# Patient Record
Sex: FEMALE | Race: WHITE | NOT HISPANIC OR LATINO | Employment: PART TIME | URBAN - METROPOLITAN AREA
[De-identification: names, ages, dates, MRNs, and addresses within clinical notes are randomized per-mention and may not be internally consistent; named-entity substitution may affect disease eponyms.]

---

## 2018-04-22 PROBLEM — Z34.90 NORMAL PREGNANCY: Status: ACTIVE | Noted: 2018-04-22

## 2018-04-22 PROBLEM — O09.529 AMA (ADVANCED MATERNAL AGE) MULTIGRAVIDA 35+: Status: ACTIVE | Noted: 2018-04-22

## 2018-04-23 ENCOUNTER — INITIAL PRENATAL (OUTPATIENT)
Dept: OBSTETRICS AND GYNECOLOGY | Facility: CLINIC | Age: 38
End: 2018-04-23

## 2018-04-23 VITALS
BODY MASS INDEX: 21.68 KG/M2 | WEIGHT: 127 LBS | HEIGHT: 64 IN | SYSTOLIC BLOOD PRESSURE: 128 MMHG | DIASTOLIC BLOOD PRESSURE: 89 MMHG

## 2018-04-23 DIAGNOSIS — O09.529 ANTEPARTUM MULTIGRAVIDA OF ADVANCED MATERNAL AGE: Primary | ICD-10-CM

## 2018-04-23 PROBLEM — F17.200 SMOKER: Status: ACTIVE | Noted: 2018-04-23

## 2018-04-23 PROBLEM — R87.619 ABNORMAL PAP SMEAR OF CERVIX: Status: ACTIVE | Noted: 2018-04-23

## 2018-04-23 LAB
B-HCG UR QL: POSITIVE
INTERNAL NEGATIVE CONTROL: NEGATIVE
INTERNAL POSITIVE CONTROL: POSITIVE
Lab: ABNORMAL

## 2018-04-23 PROCEDURE — 81025 URINE PREGNANCY TEST: CPT | Performed by: OBSTETRICS & GYNECOLOGY

## 2018-04-23 PROCEDURE — 0501F PRENATAL FLOW SHEET: CPT | Performed by: OBSTETRICS & GYNECOLOGY

## 2018-04-23 NOTE — PROGRESS NOTES
Initial OB Visit    CC- Missed Menses    Margaret Meredith is being seen today for her first obstetrical visit.  She is a 37 y.o.    6w4d gestation.   This is not a planned pregnancy. Had IUD removed about one year ago after having it in for a year.    Reports some brown tinge when she wipes.  Mild cramping, no heavy bleeding.    FOB: [facility],   #: 1, Date: 06, Sex: Female, Weight: 3033 g (6 lb 11 oz), GA: 42w0d, Delivery: Vaginal, Spontaneous Delivery, Apgar1: None, Apgar5: None, Living: Living, Birth Comments: None    #: 2, Date: None, Sex: None, Weight: None, GA: None, Delivery: None, Apgar1: None, Apgar5: None, Living: None, Birth Comments: None      Current obstetric complaints: Reports nausea and vomiting; vomiting mostly at night but not daily   Prior obstetric issues, potential pregnancy concerns: None  Family history of genetic issues (includes FOB):   Prior infections concerning in pregnancy (Rash, fever since LMP): no  Varicella Hx - immune by history  Prior testing for Cystic Fibrosis Carrier or Sickle Cell Trait- No  History of abnormal pap smears: reports h/o LEEP, unsure when  H/o STDs: HPV +  Prepregnancy BMI - Body mass index is 21.8 kg/m².    Past Medical History:   Diagnosis Date   • Abnormal Pap smear of cervix    • Factor V Leiden carrier    • Herpes    • HPV (human papilloma virus) infection      History reviewed. No pertinent surgical history.    No current outpatient prescriptions on file.    Allergies   Allergen Reactions   • Penicillins Unknown (See Comments)     Throat closes up and can not breath       Social History     Social History   • Marital status:      Spouse name: N/A   • Number of children: N/A   • Years of education: N/A     Occupational History   • Not on file.     Social History Main Topics   • Smoking status: Current Every Day Smoker     Start date: 2018   • Smokeless tobacco: Never Used   • Alcohol use No   • Drug use: No   • Sexual activity: Yes     " Partners: Male     Birth control/ protection: None     Other Topics Concern   • Not on file     Social History Narrative   • No narrative on file       Family History   Problem Relation Age of Onset   • Breast cancer Mother 35   • Diabetes Maternal Grandmother    • Ovarian cancer Neg Hx    • Uterine cancer Neg Hx    • Colon cancer Neg Hx    • Thyroid disease Neg Hx        Review of systems     Constitutional: negative for chills, fevers and for fatigue  Eyes: negative  Ears, nose, mouth, throat, and face: negative for hearing loss and nasal congestion  Respiratory: negative for asthma and wheezing  Cardiovascular: negative for chest pain and dyspnea  Gastrointestinal: negative for dyspepsia, dysphagia abdominal pain  Genitourinary:negative for urinary incontinence  Integument/breast: negative for breast lump  Hematologic/lymphatic: negative for bleeding  Musculoskeletal:negative for aches  Neurological: negative for numbness/tingling  Behavioral/Psych: negative for anhedonia  Allergic/Immunologic: negative for rash, allergy         Objective    /89   Ht 162.6 cm (64\")   Wt 57.6 kg (127 lb)   LMP 03/08/2018 (Exact Date)   BMI 21.80 kg/m²       General Appearance:    Alert, cooperative, in no acute distress, habitus normal   Head:    Normocephalic, without obvious abnormality, atraumatic   Eyes:            Lids and lashes normal, conjunctivae and sclerae normal, no   icterus, no pallor, corneas clear   Ears:    Ears appear intact with no abnormalities noted       Neck:   No adenopathy, supple, trachea midline, no thyromegaly   Back:     No kyphosis present, no scoliosis present,                       Lungs:     Clear to auscultation,respirations regular, even and                   unlabored    Heart:    Regular rhythm and normal rate, normal S1 and S2, no            murmur, no gallop, no rub, no click   Breast Exam:    No masses, No nipple discharge   Abdomen:     Normal bowel sounds, no masses, no " organomegaly, soft        non-tender, non-distended, no guarding, no rebound                 tenderness   Genitalia:    Vulva - BUS-WNL, NEFG    Vagina - + brown discharge, No bleeding    Cervix - No Lesions, closed     Uterus - Consistent with 6 weeks    Adnexa - No fabio, NT    Pelvimetry - clinically adequate, gynecoid pelvis     Extremities:   Moves all extremities well, no edema, no cyanosis, no              redness   Pulses:   Pulses palpable and equal bilaterally   Skin:   No bleeding, bruising or rash   Lymph nodes:   No palpable adenopathy   Neurologic:   Sensation intact, A&O times 3      Assessment    Pregnancy at Unknown  AMA  Former smoker, quit this pregnancy          Plan    Initial labs drawn, GC/CHL screen done  Patient is on Prenatal vitamins  Problem list reviewed and updated.  Reviewed routine prenatal care with the office to include but not limited to:   Zika (travel restrictions/ok to use insect repellant), not to changing cat litter, food restrictions, avoidance of alcohol, tobacco and drugs and saunas/hot tubs, anticipated weight gain/nutrition requirements.  Reviewed nature of practice and hospital.  Reviewed recommended follow up, importance of compliance with care. We reviewed testing in pregnancy including HIV testing and urine drug screen.    Reviewed aneuploidy screening and CF screening - undecided.  Encouraged cell free DNA secondary to AMA.  Counseled on limitations of ultrasound in pregnancy.  All questions answered.       Patient Active Problem List    Diagnosis Date Noted   • Smoker 04/23/2018     Note Last Updated: 4/23/2018     Quit once finding out she was pregnant     • Abnormal Pap smear of cervix 04/23/2018     Note Last Updated: 4/23/2018     Believes she had LEEP  Recommended mid trimester cervical length     • Normal pregnancy 04/22/2018     Note Last Updated: 4/23/2018     Dated by LMP  [ ] first trim labs [ ] 1 hour GCT  [ ] Pap [ ] GC/CT  [ ] tdap  Aneuploidydiscussed,  is undecided about  CF discussed,  is undecided about  AFP discussed, is undecided about  Breastfeeding - desires (breast fed first baby)         • AMA (advanced maternal age) multigravida 35+ 04/22/2018       Shelley Mireles MD

## 2018-04-23 NOTE — PATIENT INSTRUCTIONS
Travel During Pregnancy:  • Always use seatbelts.  A lap belt should be worn below the abdomen (across the hips) and the shoulder belt should be worn across the center of your chest (between the breasts) away from your neck.  Do not put the shoulder belt under your arm or behind your back.  Pull any slack out of the belt.  • Air travel is safe in most uncomplicated pregnancies, but we do not recommend air travel past 36 weeks.  Airlines may also have restrictions, so check with your airline before flying.  For some international flights, the travel cut off may be as early as 28 weeks gestation, and some airlines may require letters from your physician.  • When going on long trips in car, plane, train, or bus, frequent ambulation is important to prevent blood clots in legs and/or lungs.  The following may help with prevention of blood clots in legs: Drink lots of fluid, wear loose-fitting clothing, walk and stretch at regular intervals.    • Avoid areas with Zika outbreaks.  For the latest information, you may visit: www.cdc.gov/travel/notices/.  Resources: , , Committee Opinion 455  Nutrition during pregnancy  • Average weight gain during pregnancy is based on your pre-pregnancy body mass index (BMI).  See below for recommended weight gain:  o Underweight (BMI <18.5), we recommend 28 to 40lb weight gain  o Normal weight (BMI 18.5 to 24.9), we recommend a 25 to 35lb weight gain  o Overweight (BMI 25-29.9), we recommend a 15 to 25lb weight gain  o Obese (BMI >30), we recommend keeping weight gain under 20 lbs.    • You should speak with your physician regarding your specific weight goals for this pregnancy.   • Foods to avoid include:   o Fish: avoid certain types of fish such as shark, swordfish, leanna mackerel, tilefish.  Limit white (albacore) tuna to 6 oz per week.  Choose fish and shellfish such as shrimp, salmon, catfish, Busy.  o Food-borne illness: Pregnant women are much more likely to get  Listeriosis than non-pregnant women.  To help prevent this, avoid eating unpasteurized milk and unpasteurized milk products, hot dogs/lunch meat/cold cuts unless they are heated until steaming right before serving, refrigerated meat spreads, refrigerated smoked seafood, raw or undercooked seafood/eggs/meat.   • Vitamin D helps development of the baby’s bones and teeth.  Good sources of Vitamin D include milk fortified with Vitamin D and fatty fish such as salmon.    • Folic acid, also known as folate, helps develop the baby’s brain and spine.  You should make sure your vitamin contains extra folic acid - at least 400mcg.    • Iron helps make red blood cells.  You need to make extra red blood cell sin pregnancy.  We recommend eating Iron-rich foods such as lean red meat, poultry, fish, dried beans and peas, iron-fortified cereals, and prune juice.  You may be recommended an iron supplement.  If so, it is absorbed more easily if taken with vitamin C-rich foods such as citrus fruits or tomatoes.    • It is important to eat a well balanced diet.  A good recourse for nutrition recommendations is: www.choosemyplate.gov.  • Limit caffeine intake to 200mg daily.  Some coffees/teas/sodas have very different levels of caffeine per serving, so check the nutrition labeling.   Resources: , Committee Opinion 548  Exercise during pregnancy  • If you are healthy and your pregnancy is normal, it is safe to continue or start most types of exercise.   Physical activity does not increase your risk of miscarriage, low birth weight or early delivery, but you should discuss specific limitations or any complications with your physician.    • Benefits of exercise during pregnancy include: reduced back pain, less constipation, promotes overall health and healthy weight gain which may decrease risks for certain pregnancy complications such as diabetes and/or preeclampsia.  • The CDC recommends 150 minutes of moderate intensity aerobic  exercise per week.  Moderate intensity means that you are moving enough to raise your heart rate and start sweating, but you can talk normally.  Brisk walking, swimming/water workouts, modified yoga/pilates, and use of elliptical machines and/or stationary bikes are examples of aerobic activity.    • Precautions:  o Stay hydrated.  Drink plenty of water before, during and after your workout  o Wear supportive clothing such as a sports bra  o Do not become overheated.  Do not exercise outside when it is very hot or humid  o Avoid lying flat on your back as much as possible  o AVOID contact sports, skydiving, sports that risk falling (such as skiing, surfing, off-road cycling, gymnastics, horseback riding), hot yoga/hot pilates, scuba diving  • Stop exercising if you experience: bleeding from the vagina, feeling dizzy/faint, shortness of breath before starting exercise, chest pain, headache, muscle weakness, calf pain or swelling, regular uterine contractions, fluid leaking from the vagina.    • Postpartum exercise: Continuing exercise after you deliver your baby will help boost your energy, strengthen muscles, promote better sleep, and relieve stress.  It also may be useful in preventing postpartum depression.  150 minutes of moderate-intensity aerobic activity is recommended.  Types of exercise and when you can start a regular exercise routine may be limited by the type of delivery you had.  Please discuss with your physician prior to resuming or starting exercise.    Resources: ,   Back pain during pregnancy  • Back pain is very common during pregnancy.  It may arise due to strain on your back muscles, weakness of the abdominal muscles, and pregnancy hormones.    • To prevent back pain:  o Wear shoes with good support. Flat shoes and high heels may not have good arch support.  o Consider a firm mattress  o Use good lifting practices.  Do not bend from the waist to pick things up.  Squat down and bend  "your knees, keeping a straight back.  o Sit in chairs with good back support or use a small pillow behind your lower back.    o Sleep on your side with one or two pillows between your legs  • To ease back pain:   o Exercise can help stretch strained muscles and strengthen weak muscles to promote good posture  • Contact your health care provider with severe pain, pain that persists for more than 2 weeks, fever, burning during urination, or vaginal bleeding.  Resources:      Taking Over-The-Counter Medicine During Pregnancy -    Colds and Flu Symptoms   Drink plenty of fluids, rest, and use a cool mist vaporizer 18 inches from your face.     Tylenol (Acetaminophen)   Saline nasal spray, Sudafed, Benadryl, Chlor-Trimeton, Actifed, Guaifensesin, Claritin, Allegra, Vicks Vapor Rub, Breath-Right Strips  Nausea/vomiting in pregnancy:  To help with nausea and vomiting you may try the following over the counter products:  Astrid chews, astrid tea, astrid gum  Mint tea, peppermint gum or candy  B6/Doxylamine: to be taken daily, not just when symptoms occur  Pyridoxine (also known as B6): 10 to 25 mg orally every six to eight hours; the maximum treatment dose suggested for pregnant women is 200 mg/day.  Doxylamine (available in some over-the-counter sleeping pills (eg, Unisom Sleep Tabs) and as an antihistamine chewable tablet (Aldex AN)). One-half of the 25 mg over-the-counter tablet or two chewable 5 mg tablets can be used off-label as an antiemetic  · The [employer] of [employer] has released a smart phone application that can help you monitor and manage your symptoms.  The Application is \"Managing NVP,\" and has a green icon that says \"APGO WELLMOM.\"    If these do not work, we may need to try prescription medications.    Please contact us if you are unable to tolerate liquids (even sips of water) for over six to eight hours, have weight loss of over 10% of your body weight, blood in " vomit, fever (temp of 100.4 or higher), or other concerning symptoms arise.

## 2018-04-24 ENCOUNTER — TELEPHONE (OUTPATIENT)
Dept: OBSTETRICS AND GYNECOLOGY | Facility: CLINIC | Age: 38
End: 2018-04-24

## 2018-04-24 LAB
ABO GROUP BLD: (no result)
BASOPHILS # BLD AUTO: 0 X10E3/UL (ref 0–0.2)
BASOPHILS NFR BLD AUTO: 0 %
BLD GP AB SCN SERPL QL: NEGATIVE
EOSINOPHIL # BLD AUTO: 0.4 X10E3/UL (ref 0–0.4)
EOSINOPHIL NFR BLD AUTO: 4 %
ERYTHROCYTE [DISTWIDTH] IN BLOOD BY AUTOMATED COUNT: 14 % (ref 12.3–15.4)
HBV SURFACE AG SERPL QL IA: NEGATIVE
HCG INTACT+B SERPL-ACNC: NORMAL MIU/ML
HCT VFR BLD AUTO: 42.3 % (ref 34–46.6)
HGB BLD-MCNC: 13.9 G/DL (ref 11.1–15.9)
HIV 1+2 AB+HIV1 P24 AG SERPL QL IA: NON REACTIVE
IMM GRANULOCYTES # BLD: 0 X10E3/UL (ref 0–0.1)
IMM GRANULOCYTES NFR BLD: 0 %
LYMPHOCYTES # BLD AUTO: 3.1 X10E3/UL (ref 0.7–3.1)
LYMPHOCYTES NFR BLD AUTO: 31 %
MCH RBC QN AUTO: 30 PG (ref 26.6–33)
MCHC RBC AUTO-ENTMCNC: 32.9 G/DL (ref 31.5–35.7)
MCV RBC AUTO: 91 FL (ref 79–97)
MONOCYTES # BLD AUTO: 0.7 X10E3/UL (ref 0.1–0.9)
MONOCYTES NFR BLD AUTO: 7 %
NEUTROPHILS # BLD AUTO: 5.7 X10E3/UL (ref 1.4–7)
NEUTROPHILS NFR BLD AUTO: 58 %
PLATELET # BLD AUTO: 333 X10E3/UL (ref 150–379)
RBC # BLD AUTO: 4.63 X10E6/UL (ref 3.77–5.28)
RH BLD: POSITIVE
RPR SER QL: NON REACTIVE
RUBV IGG SERPL IA-ACNC: 14.4 INDEX
VZV IGG SER IA-ACNC: 1935 INDEX
WBC # BLD AUTO: 9.9 X10E3/UL (ref 3.4–10.8)

## 2018-04-25 LAB
BACTERIA UR CULT: NORMAL
BACTERIA UR CULT: NORMAL

## 2018-04-26 LAB
C TRACH RRNA SPEC QL NAA+PROBE: NEGATIVE
N GONORRHOEA RRNA SPEC QL NAA+PROBE: NEGATIVE
T VAGINALIS RRNA SPEC QL NAA+PROBE: NEGATIVE

## 2018-04-27 PROBLEM — F12.10 MILD TETRAHYDROCANNABINOL (THC) ABUSE: Status: ACTIVE | Noted: 2018-04-27

## 2018-04-27 LAB
AMPHETAMINES UR QL SCN: NEGATIVE NG/ML
BARBITURATES UR QL SCN: NEGATIVE NG/ML
BENZODIAZ UR QL: NEGATIVE NG/ML
BZE UR QL: NEGATIVE NG/ML
CANNABINOIDS UR QL SCN: POSITIVE
CYTOLOGIST CVX/VAG CYTO: ABNORMAL
CYTOLOGY CVX/VAG DOC THIN PREP: ABNORMAL
DX ICD CODE: ABNORMAL
HIV 1 & 2 AB SER-IMP: ABNORMAL
HPV I/H RISK 4 DNA CVX QL PROBE+SIG AMP: POSITIVE
HPV16 DNA CVX QL PROBE+SIG AMP: NEGATIVE
HPV18+45 E6+E7 MRNA CVX QL NAA+PROBE: NEGATIVE
METHADONE UR QL SCN: NEGATIVE NG/ML
OPIATES UR QL: NEGATIVE NG/ML
OTHER STN SPEC: ABNORMAL
PATH REPORT.FINAL DX SPEC: ABNORMAL
PCP UR QL: NEGATIVE NG/ML
PROPOXYPH UR QL: NEGATIVE NG/ML
STAT OF ADQ CVX/VAG CYTO-IMP: ABNORMAL

## 2018-04-30 ENCOUNTER — TELEPHONE (OUTPATIENT)
Dept: OBSTETRICS AND GYNECOLOGY | Facility: CLINIC | Age: 38
End: 2018-04-30

## 2018-04-30 NOTE — TELEPHONE ENCOUNTER
----- Message from Shelley Mireles MD sent at 4/30/2018  9:17 AM EDT -----  Please let patient know that her pap smear was normal but came back + for HPV.  In this case, we test for the highest risk types (16/18) which were negative.  Recommend repeat pap smear and HPV testing in one year      4/30/18  Called patient to inform results no answer left a message to return call

## 2018-05-14 ENCOUNTER — PROCEDURE VISIT (OUTPATIENT)
Dept: OBSTETRICS AND GYNECOLOGY | Facility: CLINIC | Age: 38
End: 2018-05-14

## 2018-05-14 ENCOUNTER — ROUTINE PRENATAL (OUTPATIENT)
Dept: OBSTETRICS AND GYNECOLOGY | Facility: CLINIC | Age: 38
End: 2018-05-14

## 2018-05-14 VITALS — DIASTOLIC BLOOD PRESSURE: 86 MMHG | WEIGHT: 134 LBS | SYSTOLIC BLOOD PRESSURE: 128 MMHG | BODY MASS INDEX: 23 KG/M2

## 2018-05-14 DIAGNOSIS — Z34.91 PREGNANCY WITH UNCERTAIN DATES IN FIRST TRIMESTER: Primary | ICD-10-CM

## 2018-05-14 DIAGNOSIS — O09.521 ELDERLY MULTIGRAVIDA IN FIRST TRIMESTER: Primary | ICD-10-CM

## 2018-05-14 PROCEDURE — 0502F SUBSEQUENT PRENATAL CARE: CPT | Performed by: OBSTETRICS & GYNECOLOGY

## 2018-05-14 PROCEDURE — 76817 TRANSVAGINAL US OBSTETRIC: CPT | Performed by: OBSTETRICS & GYNECOLOGY

## 2018-05-14 RX ORDER — PRENATAL VIT NO.126/IRON/FOLIC 28MG-0.8MG
TABLET ORAL DAILY
COMMUNITY
End: 2020-08-28

## 2018-05-14 NOTE — PROGRESS NOTES
Chief Complaint   Patient presents with   • Routine Prenatal Visit      Margaret Meredith is a 37 y.o.  at 6w1d   Reports no issues   Denies vaginal bleeding, cramping  Reports some mild nausea/vomiting  /86   Wt 60.8 kg (134 lb)   LMP 2018 (Exact Date)   BMI 23.00 kg/m²    Abd: gravid, nontender  See OB Flowsheet  Ultrasound today with crown-rump length of 0.44 cm consistent with 6 week 1 day  Left lateral fibroid measuring 1.36 cm  Right complex ovarian cyst measuring 2 cm  ASSESSMENT:   IUP at 9w4d   HPV Positive pap   AMA  PLAN:  See updated Problem List   Reviewed ultrasound not equal to LMP.  Given no FHT recommend repeat in one week.    Reviewed prenatal labs and pap smear - recommend repeat pap in 12 months  Reviewed testing options for aneuploidy at appropriate time.  All questions answered.  First trimester bleeding/pain precautions reviewed.  Return in about 1 week (around 2018) for ultrasound and visit.      Patient Active Problem List    Diagnosis Date Noted   • Mild tetrahydrocannabinol (THC) abuse 2018     Note Last Updated: 2018     On initial UDS     • Smoker 2018     Note Last Updated: 2018     Quit once finding out she was pregnant     • Abnormal Pap smear of cervix 2018     Note Last Updated: 2018     Believes she had LEEP  Recommended mid trimester cervical length  18: NIL, HR HPV + but 16/18 negative - repeat at one year     • Normal pregnancy 2018     Note Last Updated: 2018     Dated by LMP  [ ] first trim labs [ ] 1 hour GCT  [ ] Pap [ ] GC/CT  [ ] tdap  Aneuploidydiscussed, is undecided about  CF discussed,  is undecided about  AFP discussed, is undecided about  Breastfeeding - desires (breast fed first baby)         • AMA (advanced maternal age) multigravida 35+ 2018       No orders of the defined types were placed in this encounter.

## 2018-05-18 ENCOUNTER — APPOINTMENT (OUTPATIENT)
Dept: ULTRASOUND IMAGING | Facility: HOSPITAL | Age: 38
End: 2018-05-18

## 2018-05-18 ENCOUNTER — HOSPITAL ENCOUNTER (EMERGENCY)
Facility: HOSPITAL | Age: 38
Discharge: HOME | End: 2018-05-18
Attending: EMERGENCY MEDICINE | Admitting: EMERGENCY MEDICINE

## 2018-05-18 VITALS
HEART RATE: 81 BPM | TEMPERATURE: 97.5 F | RESPIRATION RATE: 18 BRPM | OXYGEN SATURATION: 100 % | DIASTOLIC BLOOD PRESSURE: 86 MMHG | WEIGHT: 135 LBS | HEIGHT: 64 IN | BODY MASS INDEX: 23.05 KG/M2 | SYSTOLIC BLOOD PRESSURE: 130 MMHG

## 2018-05-18 DIAGNOSIS — O03.9 MISCARRIAGE: Primary | ICD-10-CM

## 2018-05-18 LAB
ABO GROUP BLD: NORMAL
ANION GAP SERPL CALCULATED.3IONS-SCNC: 14.7 MMOL/L
BASOPHILS # BLD AUTO: 0.05 10*3/MM3 (ref 0–0.2)
BASOPHILS NFR BLD AUTO: 0.5 % (ref 0–1.5)
BILIRUB UR QL STRIP: NEGATIVE
BUN BLD-MCNC: 13 MG/DL (ref 6–20)
BUN/CREAT SERPL: 21.7 (ref 7–25)
CALCIUM SPEC-SCNC: 9.6 MG/DL (ref 8.6–10.5)
CHLORIDE SERPL-SCNC: 100 MMOL/L (ref 98–107)
CLARITY UR: CLEAR
CLUE CELLS SPEC QL WET PREP: NORMAL
CO2 SERPL-SCNC: 23.3 MMOL/L (ref 22–29)
COLOR UR: YELLOW
CREAT BLD-MCNC: 0.6 MG/DL (ref 0.57–1)
DEPRECATED RDW RBC AUTO: 49.8 FL (ref 37–54)
EOSINOPHIL # BLD AUTO: 0.48 10*3/MM3 (ref 0–0.7)
EOSINOPHIL NFR BLD AUTO: 4.5 % (ref 0.3–6.2)
ERYTHROCYTE [DISTWIDTH] IN BLOOD BY AUTOMATED COUNT: 14.9 % (ref 11.7–13)
GFR SERPL CREATININE-BSD FRML MDRD: 113 ML/MIN/1.73
GLUCOSE BLD-MCNC: 88 MG/DL (ref 65–99)
GLUCOSE UR STRIP-MCNC: NEGATIVE MG/DL
HCG INTACT+B SERPL-ACNC: NORMAL MIU/ML
HCT VFR BLD AUTO: 40.2 % (ref 35.6–45.5)
HGB BLD-MCNC: 13.3 G/DL (ref 11.9–15.5)
HGB UR QL STRIP.AUTO: NEGATIVE
HOLD SPECIMEN: NORMAL
HOLD SPECIMEN: NORMAL
HYDATID CYST SPEC WET PREP: NORMAL
IMM GRANULOCYTES # BLD: 0.03 10*3/MM3 (ref 0–0.03)
IMM GRANULOCYTES NFR BLD: 0.3 % (ref 0–0.5)
KETONES UR QL STRIP: NEGATIVE
KOH PREP NAIL: NORMAL
LEUKOCYTE ESTERASE UR QL STRIP.AUTO: NEGATIVE
LYMPHOCYTES # BLD AUTO: 3.42 10*3/MM3 (ref 0.9–4.8)
LYMPHOCYTES NFR BLD AUTO: 31.8 % (ref 19.6–45.3)
MCH RBC QN AUTO: 30.2 PG (ref 26.9–32)
MCHC RBC AUTO-ENTMCNC: 33.1 G/DL (ref 32.4–36.3)
MCV RBC AUTO: 91.2 FL (ref 80.5–98.2)
MONOCYTES # BLD AUTO: 0.66 10*3/MM3 (ref 0.2–1.2)
MONOCYTES NFR BLD AUTO: 6.1 % (ref 5–12)
NEUTROPHILS # BLD AUTO: 6.14 10*3/MM3 (ref 1.9–8.1)
NEUTROPHILS NFR BLD AUTO: 57.1 % (ref 42.7–76)
NITRITE UR QL STRIP: NEGATIVE
PH UR STRIP.AUTO: 5.5 [PH] (ref 5–8)
PLATELET # BLD AUTO: 301 10*3/MM3 (ref 140–500)
PMV BLD AUTO: 12.2 FL (ref 6–12)
POTASSIUM BLD-SCNC: 4.1 MMOL/L (ref 3.5–5.2)
PROT UR QL STRIP: NEGATIVE
RBC # BLD AUTO: 4.41 10*6/MM3 (ref 3.9–5.2)
RH BLD: POSITIVE
SODIUM BLD-SCNC: 138 MMOL/L (ref 136–145)
SP GR UR STRIP: 1.01 (ref 1–1.03)
T VAGINALIS SPEC QL WET PREP: NORMAL
UROBILINOGEN UR QL STRIP: NORMAL
WBC NRBC COR # BLD: 10.75 10*3/MM3 (ref 4.5–10.7)
WBC SPEC QL WET PREP: NORMAL
WHOLE BLOOD HOLD SPECIMEN: NORMAL
WHOLE BLOOD HOLD SPECIMEN: NORMAL
YEAST GENITAL QL WET PREP: NORMAL

## 2018-05-18 PROCEDURE — 87591 N.GONORRHOEAE DNA AMP PROB: CPT | Performed by: EMERGENCY MEDICINE

## 2018-05-18 PROCEDURE — 81003 URINALYSIS AUTO W/O SCOPE: CPT | Performed by: EMERGENCY MEDICINE

## 2018-05-18 PROCEDURE — 76817 TRANSVAGINAL US OBSTETRIC: CPT

## 2018-05-18 PROCEDURE — 93976 VASCULAR STUDY: CPT

## 2018-05-18 PROCEDURE — 99284 EMERGENCY DEPT VISIT MOD MDM: CPT

## 2018-05-18 PROCEDURE — 84702 CHORIONIC GONADOTROPIN TEST: CPT

## 2018-05-18 PROCEDURE — 36415 COLL VENOUS BLD VENIPUNCTURE: CPT

## 2018-05-18 PROCEDURE — 87220 TISSUE EXAM FOR FUNGI: CPT | Performed by: EMERGENCY MEDICINE

## 2018-05-18 PROCEDURE — 86900 BLOOD TYPING SEROLOGIC ABO: CPT

## 2018-05-18 PROCEDURE — 87210 SMEAR WET MOUNT SALINE/INK: CPT | Performed by: EMERGENCY MEDICINE

## 2018-05-18 PROCEDURE — 76815 OB US LIMITED FETUS(S): CPT

## 2018-05-18 PROCEDURE — 86901 BLOOD TYPING SEROLOGIC RH(D): CPT

## 2018-05-18 PROCEDURE — 85025 COMPLETE CBC W/AUTO DIFF WBC: CPT

## 2018-05-18 PROCEDURE — 87491 CHLMYD TRACH DNA AMP PROBE: CPT | Performed by: EMERGENCY MEDICINE

## 2018-05-18 PROCEDURE — 80048 BASIC METABOLIC PNL TOTAL CA: CPT

## 2018-05-18 RX ORDER — SODIUM CHLORIDE 0.9 % (FLUSH) 0.9 %
10 SYRINGE (ML) INJECTION AS NEEDED
Status: DISCONTINUED | OUTPATIENT
Start: 2018-05-18 | End: 2018-05-19 | Stop reason: HOSPADM

## 2018-05-18 RX ORDER — MISOPROSTOL 200 UG/1
200 TABLET ORAL 4 TIMES DAILY
Qty: 4 TABLET | Refills: 0 | Status: SHIPPED | OUTPATIENT
Start: 2018-05-18 | End: 2018-05-19

## 2018-05-18 RX ORDER — HYDROCODONE BITARTRATE AND ACETAMINOPHEN 7.5; 325 MG/1; MG/1
1 TABLET ORAL ONCE
Status: COMPLETED | OUTPATIENT
Start: 2018-05-18 | End: 2018-05-18

## 2018-05-18 RX ORDER — HYDROCODONE BITARTRATE AND ACETAMINOPHEN 5; 325 MG/1; MG/1
1 TABLET ORAL EVERY 6 HOURS PRN
Qty: 12 TABLET | Refills: 0 | Status: SHIPPED | OUTPATIENT
Start: 2018-05-18 | End: 2019-09-03

## 2018-05-18 RX ADMIN — HYDROCODONE BITARTRATE AND ACETAMINOPHEN 1 TABLET: 7.5; 325 TABLET ORAL at 22:43

## 2018-05-18 NOTE — ED NOTES
Patient stated she only goes through one pad a day, bright red in color.      Galileo Doss RN  05/18/18 [id number]

## 2018-05-18 NOTE — ED TRIAGE NOTES
Patient to er with c/o vaginal bleeding and stated she is aprox  6 week pregnant. Patient c/o lower abd cramping.

## 2018-05-19 NOTE — ED PROVIDER NOTES
EMERGENCY DEPARTMENT ENCOUNTER    CHIEF COMPLAINT  Chief Complaint: vaginal bleeding  History given by: patient  History limited by: none  Room Number:   PMD: No Known Provider   OBGYN: Dr. Mireles      HPI:  Pt is a 37 y.o. female [id number] [place] 19 who presents complaining of vaginal bleeding that began three days ago and began to worsen around 1600 today. Patient had associated suprapubic abd cramping today at 1600. Patient has gone through four pads in the past 24 hours, three of which were in the ED today. Patient states that she has been passing clots in the ED. Patient had a vaginal delivery with her previous pregnancy with no complications. Patient reports she had a normal US performed four days ago by her OBGYN.    Duration:  Three days ago  Onset: gradual  Timing: constant  Location:   Radiation: none  Quality: vaginal bleeding   Intensity/Severity: moderate  Progression: worsening  Associated Symptoms: abd cramping  Aggravating Factors: not stated  Alleviating Factors: none  Previous Episodes: none  Treatment before arrival: seen by OBGYN    PAST MEDICAL HISTORY  Active Ambulatory Problems     Diagnosis Date Noted   • Normal pregnancy 2018   • AMA (advanced maternal age) multigravida 35+ 2018   • Smoker 2018   • Abnormal Pap smear of cervix 2018   • Mild tetrahydrocannabinol (THC) abuse 2018     Resolved Ambulatory Problems     Diagnosis Date Noted   • No Resolved Ambulatory Problems     Past Medical History:   Diagnosis Date   • Abnormal Pap smear of cervix    • Factor V Leiden carrier    • Herpes    • HPV (human papilloma virus) infection        PAST SURGICAL HISTORY  History reviewed. No pertinent surgical history.    FAMILY HISTORY  Family History   Problem Relation Age of Onset   • Breast cancer Mother 35   • Diabetes Maternal Grandmother    • Ovarian cancer Neg Hx    • Uterine cancer Neg Hx    • Colon cancer Neg Hx    • Thyroid disease Neg Hx        SOCIAL  HISTORY  Social History     Social History   • Marital status:      Spouse name: N/A   • Number of children: N/A   • Years of education: N/A     Occupational History   • Not on file.     Social History Main Topics   • Smoking status: Never Smoker   • Smokeless tobacco: Never Used   • Alcohol use No   • Drug use: No   • Sexual activity: Yes     Partners: Male     Birth control/ protection: None     Other Topics Concern   • Not on file     Social History Narrative   • No narrative on file       ALLERGIES  Penicillins    REVIEW OF SYSTEMS  Review of Systems   Constitutional: Negative for chills and fever.   HENT: Negative for rhinorrhea and sore throat.    Eyes: Negative for visual disturbance.   Respiratory: Negative for cough and shortness of breath.    Cardiovascular: Negative for chest pain, palpitations and leg swelling.   Gastrointestinal: Negative for diarrhea and vomiting.   Endocrine: Negative.    Genitourinary: Positive for pelvic pain and vaginal bleeding. Negative for decreased urine volume, dysuria and frequency.   Musculoskeletal: Negative for neck pain.   Skin: Negative for rash.   Neurological: Negative for syncope and light-headedness.   Psychiatric/Behavioral: Negative.    All other systems reviewed and are negative.      PHYSICAL EXAM  ED Triage Vitals   Temp Heart Rate Resp BP SpO2   05/18/18 1746 05/18/18 1746 05/18/18 1746 05/18/18 1756 05/18/18 1746   97.9 °F (36.6 °C) 106 16 137/86 99 %      Temp src Heart Rate Source Patient Position BP Location FiO2 (%)   -- -- 05/18/18 1756 -- --     Sitting         Physical Exam   Constitutional: She is oriented to person, place, and time.  Non-toxic appearance. She appears distressed (mild).   HENT:   Head: Normocephalic and atraumatic.   Eyes: EOM are normal.   Neck: Normal range of motion. Neck supple.   Cardiovascular: Normal rate, regular rhythm, normal heart sounds and intact distal pulses.    No murmur heard.  Pulses:       Posterior tibial  pulses are 2+ on the right side, and 2+ on the left side.   Pulmonary/Chest: Effort normal and breath sounds normal. No respiratory distress.   Abdominal: Soft. Bowel sounds are normal. There is tenderness (mild - greatest in the suprapubic area ). There is no rebound and no guarding.   Genitourinary: Vagina normal and vulva normal. Uterus is not enlarged.   Cervix is not fixed. Cervix exhibits no motion tenderness, no lesion and no tenderness. Right adnexum displays no mass and no tenderness. Left adnexum displays no mass and no tenderness.   Genitourinary Comments:  exam performed with female at bedside. Cervical os is closed. Mild/moderate amount of blood cleared from the vaginal vault without active bleeding from cervix   Musculoskeletal: Normal range of motion. She exhibits no edema.   Neurological: She is alert and oriented to person, place, and time.   Skin: Skin is warm and dry.   Psychiatric: Affect normal.   Nursing note and vitals reviewed.      LAB RESULTS  Lab Results (last 24 hours)     Procedure Component Value Units Date/Time    CBC & Differential [[id number]] Collected:  05/18/18 1805    Specimen:  Blood Updated:  05/18/18 1854    Narrative:       The following orders were created for panel order CBC & Differential.  Procedure                               Abnormality         Status                     ---------                               -----------         ------                     CBC Auto Differential[[id number]]        Abnormal            Final result                 Please view results for these tests on the individual orders.    Basic Metabolic Panel [[id number]] Collected:  05/18/18 1805    Specimen:  Blood Updated:  05/18/18 1914     Glucose 88 mg/dL      BUN 13 mg/dL      Creatinine 0.60 mg/dL      Sodium 138 mmol/L      Potassium 4.1 mmol/L      Chloride 100 mmol/L      CO2 23.3 mmol/L      Calcium 9.6 mg/dL      eGFR Non African Amer 113 mL/min/1.73      BUN/Creatinine Ratio 21.7      Anion Gap 14.7 mmol/L     Narrative:       GFR Normal >60  Chronic Kidney Disease <60  Kidney Failure <15    hCG, Quantitative, Pregnancy [[id number]] Collected:  05/18/18 1805    Specimen:  Blood Updated:  05/18/18 1929     HCG Quantitative 35,055.00 mIU/mL     Narrative:       HCG Ranges by Gestational Age    3 Weeks         5.4 -      72 mIU/mL  4 Weeks        10.2 -     708 mIU/mL  5 Weeks       217   -   8,245 mIU/mL  6 Weeks       152   -  32,177 mIU/mL  7 Weeks     4,059   - 153,767 mIU/mL  8 Weeks    31,366   - 149,094 mIU/mL  9 Weeks    59,109   - 135,901 mIU/mL  10 Weeks   44,186   - 170,409 mIU/mL  12 Weeks   27,107   - 201,615 mIU/mL  14 Weeks   24,302   -  93,646 mIU/mL  15 Weeks   12,540   -  69,747 mIU/mL  16 Weeks    8,904   -  55,332 mIU/mL  17 Weeks    8,240   -  51,793 mIU/mL  18 Weeks    9,649   -  55,271 mIU/mL    CBC Auto Differential [[id number]]  (Abnormal) Collected:  05/18/18 1805    Specimen:  Blood Updated:  05/18/18 1854     WBC 10.75 (H) 10*3/mm3      RBC 4.41 10*6/mm3      Hemoglobin 13.3 g/dL      Hematocrit 40.2 %      MCV 91.2 fL      MCH 30.2 pg      MCHC 33.1 g/dL      RDW 14.9 (H) %      RDW-SD 49.8 fl      MPV 12.2 (H) fL      Platelets 301 10*3/mm3      Neutrophil % 57.1 %      Lymphocyte % 31.8 %      Monocyte % 6.1 %      Eosinophil % 4.5 %      Basophil % 0.5 %      Immature Grans % 0.3 %      Neutrophils, Absolute 6.14 10*3/mm3      Lymphocytes, Absolute 3.42 10*3/mm3      Monocytes, Absolute 0.66 10*3/mm3      Eosinophils, Absolute 0.48 10*3/mm3      Basophils, Absolute 0.05 10*3/mm3      Immature Grans, Absolute 0.03 10*3/mm3     CHRISTY Prep - Swab, Vagina [[id number]] Collected:  05/18/18 2204    Specimen:  Swab from Vagina Updated:  05/18/18 2216    Wet Prep, Genital - Swab, Vagina [[id number]] Collected:  05/18/18 2204    Specimen:  Swab from Vagina Updated:  05/18/18 2216    Urinalysis With / Microscopic If Indicated - Urine, Catheter [[id number]]  (Normal) Collected:   05/18/18 2205    Specimen:  Urine from Urine, Catheter Updated:  05/18/18 2221     Color, UA Yellow     Appearance, UA Clear     pH, UA 5.5     Specific Gravity, UA 1.015     Glucose, UA Negative     Ketones, UA Negative     Bilirubin, UA Negative     Blood, UA Negative     Protein, UA Negative     Leuk Esterase, UA Negative     Nitrite, UA Negative     Urobilinogen, UA 0.2 E.U./dL    Narrative:       Urine microscopic not indicated.    Chlamydia trachomatis, Neisseria gonorrhoeae, PCR - Swab, Cervix [[id number]] Collected:  05/18/18 2205    Specimen:  Swab from Cervix Updated:  05/18/18 2216          I ordered the above labs and reviewed the results    RADIOLOGY  US Ob Limited 1 + Fetuses   Final Result   1. Prominent uterus with 13 mm solid mass felt to reflect leiomyoma.   2. Normal ovaries.   3. No intrauterine pregnancy is demonstrated, ectopic pregnancy not   excluded.       This report was finalized on 5/18/2018 9:18 PM by Mj Monaco M.D.          US Ob Transvaginal    (Results Pending)   US Testicular or Ovarian Vascular Limited    (Results Pending)        I ordered the above noted radiological studies. Interpreted by radiologist.  Reviewed by me in PACS.       PROCEDURES  Procedures      PROGRESS AND CONSULTS     1757: labs and US ordered.     2103: additional labs ordered.     2200: Discussed case with Dr. Ludwig OBGYN concerning the patient and the findings in the ED. Dr. Ludwig states he has reviewed the images from today and the US from the office (which he reports showed IUP without fetal heart activity) .   He requests for the patient be given Rx for Misoprostol 200mg PO #4 Q6  and for the patient to be given pain medication as needed. Patient is to follow up with the office on Monday 5/21 with Dr Mireles. Pt understands and agrees with the plan, all questions answered.    1028: Rechecked pt. I discussed US findings of miscarriage and consultation with OBGYN at this time. I discussed plan for the  patient to be placed on Misoprostol and the importance of follow up on Monday. Patient is requesting pain medications in the ED and to go home with.     OSITO query complete. Treatment plan to include limited course of prescribed  controlled substance. Risks including addiction, benefits, and alternatives presented to patient.     MEDICAL DECISION MAKING  Results were reviewed/discussed with the patient and they were also made aware of online access. Pt also made aware that some labs, such as cultures, will not be resulted during ER visit and follow up with PMD is necessary.     MDM  Number of Diagnoses or Management Options  Miscarriage in first trimester:      Amount and/or Complexity of Data Reviewed  Clinical lab tests: reviewed and ordered (A positive. HcG Quant 35,055. BUN 13, creatinine 0.60)  Tests in the radiology section of CPT®: reviewed and ordered (US: no IUP)  Decide to obtain previous medical records or to obtain history from someone other than the patient: yes  Review and summarize past medical records: yes (Patient had an US performed on 5/14/18 that showed a positive IUP without FHT. [place] 1/6/19.  )  Discuss the patient with other providers: yes (OBGYN)    Patient Progress  Patient progress: stable         DIAGNOSIS  Final diagnoses:   Miscarriage in first trimester       DISPOSITION  DISCHARGE    Patient discharged in stable condition.    Reviewed implications of results, diagnosis, meds, responsibility to follow up, warning signs and symptoms of possible worsening, potential complications and reasons to return to ER.    Patient/Family voiced understanding of above instructions.    Discussed plan for discharge, as there is no emergent indication for admission. Patient referred to primary care provider for BP management due to today's BP. Pt/family is agreeable and understands need for follow up and repeat testing.  Pt is aware that discharge does not mean that nothing is wrong but it indicates no  emergency is present that requires admission and they must continue care with follow-up as given below or physician of their choice.     FOLLOW-UP  PATIENT LIAISON [place] [address]  [phone]  Schedule an appointment as soon as possible for a visit in 2 days  As needed, If symptoms worsen    Shelley Mireles MD  [address]  [address]  [facility] [address]  [phone]    Go in 2 days  EVEN IF WELL on monday May 21st as discussed with Dr Ludwig in the ED today         Medication List      New Prescriptions    HYDROcodone-acetaminophen 5-325 MG per tablet  Commonly known as:  NORCO  Take 1 tablet by mouth Every 6 (Six) Hours As Needed for Moderate Pain .     misoprostol 200 MCG tablet  Commonly known as:  CYTOTEC  Take 1 tablet by mouth 4 (Four) Times a Day for 1 day.          Latest Documented Vital Signs:  As of 10:39 PM  BP- 120/78 HR- 74 Temp- 97.9 °F (36.6 °C) O2 sat- 99%    --  Documentation assistance provided by wally August for Arpita Fuentes MD.  Information recorded by the scribe was done at my direction and has been verified and validated by me.     Jana August  05/18/18 1258           Arpita Fuentes MD  05/21/18 0791

## 2018-05-19 NOTE — DISCHARGE INSTRUCTIONS
Follow up with Dr Mireles on Monday 5/21 as discussed with Dr Ludwig  Take the misoprostol as directed by Dr Ludwig   Drink plenty of fluids  Continue taking prenatal vitamins    You are advised to follow closely with Dr Mireles in 2-3 days for recheck, pelvic culture results, final results of lab work and imaging testing, and further testing/treatment as needed.    Please return to the emergency department immediately with chest pain different than usual for you, shortness of air, abdominal pain, persistent vomiting/fever, blood in emesis or stool, lightheadedness/fainting, problems with speech, one sided weakness/numbness, new incontinence, problems with vision, bleeding greater than one pad per hour, pain not controlled with medications or for worsening of symptoms or other concerns.

## 2018-05-21 ENCOUNTER — OFFICE VISIT (OUTPATIENT)
Dept: OBSTETRICS AND GYNECOLOGY | Facility: CLINIC | Age: 38
End: 2018-05-21

## 2018-05-21 VITALS
DIASTOLIC BLOOD PRESSURE: 92 MMHG | SYSTOLIC BLOOD PRESSURE: 129 MMHG | BODY MASS INDEX: 23.05 KG/M2 | WEIGHT: 135 LBS | HEART RATE: 86 BPM | HEIGHT: 64 IN | TEMPERATURE: 97.7 F

## 2018-05-21 DIAGNOSIS — O03.9 SAB (SPONTANEOUS ABORTION): Primary | ICD-10-CM

## 2018-05-21 PROCEDURE — 99213 OFFICE O/P EST LOW 20 MIN: CPT | Performed by: OBSTETRICS & GYNECOLOGY

## 2018-05-21 NOTE — PROGRESS NOTES
"Nayely Meredith is a 37 y.o. female   Chief Complaint   Patient presents with   • Miscarriage      History of Present Illness  Patient reports on Friday she noticed very heavy bleeding and cramping.  She then went to the ER.  Since then her bleeding has substantially improved and she is no longer having any cramping.  She is understandably emotional about the miscarriage.  She is well supported by her .  She is not sure if she desires contraception or to pursue fertility at this time.  Past Medical History:   Diagnosis Date   • Abnormal Pap smear of cervix    • Factor V Leiden carrier    • Herpes    • HPV (human papilloma virus) infection      No past surgical history on file.  Social History   Substance Use Topics   • Smoking status: Never Smoker   • Smokeless tobacco: Never Used   • Alcohol use No     Family History   Problem Relation Age of Onset   • Breast cancer Mother 35   • Diabetes Maternal Grandmother    • Ovarian cancer Neg Hx    • Uterine cancer Neg Hx    • Colon cancer Neg Hx    • Thyroid disease Neg Hx         Review of Systems   Constitutional: Negative for activity change, appetite change, fatigue, fever and unexpected weight change.   Gastrointestinal: Negative for abdominal pain, nausea and vomiting.   Genitourinary: Positive for vaginal bleeding. Negative for menstrual problem, vaginal discharge and vaginal pain.   Hematological: Does not bruise/bleed easily.   Psychiatric/Behavioral: Negative for agitation.       Objective    /92   Pulse 86   Temp 97.7 °F (36.5 °C) (Oral)   Ht 162.6 cm (64.02\")   Wt 61.2 kg (135 lb)   LMP 03/08/2018 (Exact Date)   BMI 23.16 kg/m²    Physical Exam   Constitutional: She is oriented to person, place, and time. She appears well-developed and well-nourished.   HENT:   Head: Normocephalic and atraumatic.   Eyes: EOM are normal. No scleral icterus.   Neck: Normal range of motion.   Cardiovascular: Normal rate.    Pulmonary/Chest: Effort " normal. No respiratory distress.   Abdominal: Soft.   Genitourinary: Uterus normal. Uterus is not deviated, not enlarged, not fixed and not tender. Cervix exhibits no motion tenderness, no discharge and no friability. Right adnexum displays no mass, no tenderness and no fullness. Left adnexum displays no mass, no tenderness and no fullness. There is bleeding (scant) in the vagina. No erythema or tenderness in the vagina. No foreign body in the vagina. No signs of injury around the vagina. No vaginal discharge found.   Genitourinary Comments: Cervix closed   Neurological: She is alert and oriented to person, place, and time.   Skin: Skin is warm and dry.   Psychiatric: She has a normal mood and affect. Her behavior is normal.         Assessment/Plan   Problems Addressed this Visit     None      Visit Diagnoses     SAB (spontaneous )    -  Primary        We reviewed the diagnosis of spontaneous , or miscarriage.  Patient's questions were answered and condolences were offered. Based on the ultrasound in the ED compared to the office ultrasound, the miscarriage appears complete.  Also reassuring that her bleeding is less and cervix is closed.  Reviewed bleeding and pain precautions.  Reviewed common causes of miscarriage, including aneuploidy.    Rh Positive  Patient coping appropriately and well supported.    Patient undecided on future fertility plans. Reviewed options and she will let us know if she desires contraception  Reviewed abnormal pap smear and plan for repeat next year.    All questions were answered to patient's apparent satisfaction upon conclusion of our discussion.  Patient will follow up as needed.

## 2018-05-22 LAB
C TRACH RRNA SPEC DONR QL NAA+PROBE: NEGATIVE
N GONORRHOEA DNA SPEC QL NAA+PROBE: NEGATIVE

## 2019-08-26 ENCOUNTER — HOSPITAL ENCOUNTER (EMERGENCY)
Facility: HOSPITAL | Age: 39
Discharge: HOME | End: 2019-08-27
Attending: EMERGENCY MEDICINE | Admitting: EMERGENCY MEDICINE

## 2019-08-26 DIAGNOSIS — O20.9 FIRST TRIMESTER BLEEDING: Primary | ICD-10-CM

## 2019-08-26 DIAGNOSIS — O46.8X1 SUBCHORIONIC HEMORRHAGE OF PLACENTA IN FIRST TRIMESTER, SINGLE OR UNSPECIFIED FETUS: ICD-10-CM

## 2019-08-26 DIAGNOSIS — O41.8X10 SUBCHORIONIC HEMORRHAGE OF PLACENTA IN FIRST TRIMESTER, SINGLE OR UNSPECIFIED FETUS: ICD-10-CM

## 2019-08-26 PROCEDURE — 99283 EMERGENCY DEPT VISIT LOW MDM: CPT

## 2019-08-27 ENCOUNTER — APPOINTMENT (OUTPATIENT)
Dept: ULTRASOUND IMAGING | Facility: HOSPITAL | Age: 39
End: 2019-08-27

## 2019-08-27 VITALS
HEART RATE: 98 BPM | OXYGEN SATURATION: 99 % | TEMPERATURE: 98.6 F | SYSTOLIC BLOOD PRESSURE: 126 MMHG | DIASTOLIC BLOOD PRESSURE: 83 MMHG | BODY MASS INDEX: 23.56 KG/M2 | HEIGHT: 64 IN | WEIGHT: 138 LBS | RESPIRATION RATE: 18 BRPM

## 2019-08-27 LAB
ABO GROUP BLD: NORMAL
BASOPHILS # BLD AUTO: 0.06 10*3/MM3 (ref 0–0.2)
BASOPHILS NFR BLD AUTO: 0.5 % (ref 0–1.5)
DEPRECATED RDW RBC AUTO: 51.1 FL (ref 37–54)
EOSINOPHIL # BLD AUTO: 0.36 10*3/MM3 (ref 0–0.4)
EOSINOPHIL NFR BLD AUTO: 3.1 % (ref 0.3–6.2)
ERYTHROCYTE [DISTWIDTH] IN BLOOD BY AUTOMATED COUNT: 15.6 % (ref 12.3–15.4)
HCG INTACT+B SERPL-ACNC: NORMAL MIU/ML
HCT VFR BLD AUTO: 36.6 % (ref 34–46.6)
HGB BLD-MCNC: 12 G/DL (ref 12–15.9)
IMM GRANULOCYTES # BLD AUTO: 0.08 10*3/MM3 (ref 0–0.05)
IMM GRANULOCYTES NFR BLD AUTO: 0.7 % (ref 0–0.5)
LYMPHOCYTES # BLD AUTO: 3.21 10*3/MM3 (ref 0.7–3.1)
LYMPHOCYTES NFR BLD AUTO: 27.6 % (ref 19.6–45.3)
MCH RBC QN AUTO: 29.4 PG (ref 26.6–33)
MCHC RBC AUTO-ENTMCNC: 32.8 G/DL (ref 31.5–35.7)
MCV RBC AUTO: 89.7 FL (ref 79–97)
MONOCYTES # BLD AUTO: 0.73 10*3/MM3 (ref 0.1–0.9)
MONOCYTES NFR BLD AUTO: 6.3 % (ref 5–12)
NEUTROPHILS # BLD AUTO: 7.21 10*3/MM3 (ref 1.7–7)
NEUTROPHILS NFR BLD AUTO: 61.8 % (ref 42.7–76)
NRBC BLD AUTO-RTO: 0 /100 WBC (ref 0–0.2)
PLATELET # BLD AUTO: 307 10*3/MM3 (ref 140–450)
PMV BLD AUTO: 11.7 FL (ref 6–12)
RBC # BLD AUTO: 4.08 10*6/MM3 (ref 3.77–5.28)
RH BLD: POSITIVE
WBC NRBC COR # BLD: 11.65 10*3/MM3 (ref 3.4–10.8)

## 2019-08-27 PROCEDURE — 85025 COMPLETE CBC W/AUTO DIFF WBC: CPT | Performed by: EMERGENCY MEDICINE

## 2019-08-27 PROCEDURE — 86901 BLOOD TYPING SEROLOGIC RH(D): CPT | Performed by: EMERGENCY MEDICINE

## 2019-08-27 PROCEDURE — 76815 OB US LIMITED FETUS(S): CPT

## 2019-08-27 PROCEDURE — 84702 CHORIONIC GONADOTROPIN TEST: CPT | Performed by: EMERGENCY MEDICINE

## 2019-08-27 PROCEDURE — 76817 TRANSVAGINAL US OBSTETRIC: CPT

## 2019-08-27 PROCEDURE — 86900 BLOOD TYPING SEROLOGIC ABO: CPT | Performed by: EMERGENCY MEDICINE

## 2019-08-27 PROCEDURE — 93976 VASCULAR STUDY: CPT

## 2019-08-29 ENCOUNTER — OFFICE VISIT (OUTPATIENT)
Dept: OBSTETRICS AND GYNECOLOGY | Facility: CLINIC | Age: 39
End: 2019-08-29

## 2019-08-29 VITALS
HEART RATE: 101 BPM | BODY MASS INDEX: 25.27 KG/M2 | WEIGHT: 148 LBS | DIASTOLIC BLOOD PRESSURE: 82 MMHG | SYSTOLIC BLOOD PRESSURE: 130 MMHG | HEIGHT: 64 IN

## 2019-08-29 DIAGNOSIS — O20.9 BLEEDING IN EARLY PREGNANCY: Primary | ICD-10-CM

## 2019-08-29 PROCEDURE — 99214 OFFICE O/P EST MOD 30 MIN: CPT | Performed by: OBSTETRICS & GYNECOLOGY

## 2019-08-29 NOTE — PROGRESS NOTES
SUBJECTIVE:   Chief Complaint   Patient presents with   • Pregnancy Problem     pt reports she has been spotting for 2 weeks.         Margaret Meredith is a 38 y.o.  who presents for bleeding in early pregnancy.  She was seen in the ER  and diagnosed with a subchorionic hemorrhage measuring 1.6 x 0.7 cm.  She had a viable IUP.  She also has a fibroid measuring 4.2 x 4.3 x 3.6 cm.  On Tuesday, her bleeding was quite heavy but since then it has been no more than light spotting.  She denies any cramping.  She is very anxious secondary to history of miscarriage in May of last year.    Past Medical History:   Diagnosis Date   • Abnormal Pap smear of cervix    • Factor V Leiden carrier (CMS/HCC)    • Herpes    • HPV (human papilloma virus) infection      Past Surgical History:   Procedure Laterality Date   • TONSILLECTOMY       OB History    Para Term  AB Living   4 1 1   2 1   SAB TAB Ectopic Molar Multiple Live Births   1 1       1      # Outcome Date GA Lbr Severino/2nd Weight Sex Delivery Anes PTL Lv   4 Current            3 Term 06 42w0d  3033 g (6 lb 11 oz) F Vag-Spont   SAKSHI   2 SAB      SAB      1 TAB      TAB            Social History     Tobacco Use   • Smoking status: Former Smoker     Start date: 2018   • Smokeless tobacco: Never Used   Substance Use Topics   • Alcohol use: No   • Drug use: No     Family History   Problem Relation Age of Onset   • Breast cancer Mother 35   • Diabetes Maternal Grandmother    • Ovarian cancer Neg Hx    • Uterine cancer Neg Hx    • Colon cancer Neg Hx    • Thyroid disease Neg Hx    • Deep vein thrombosis Neg Hx    • Pulmonary embolism Neg Hx      Current Outpatient Medications on File Prior to Visit   Medication Sig Dispense Refill   • HYDROcodone-acetaminophen (NORCO) 5-325 MG per tablet Take 1 tablet by mouth Every 6 (Six) Hours As Needed for Moderate Pain . 12 tablet 0   • Prenatal Vit-Fe Fumarate-FA (PRENATAL, CLASSIC, VITAMIN) 28-0.8 MG  "tablet tablet Take  by mouth Daily.       No current facility-administered medications on file prior to visit.      Allergies   Allergen Reactions   • Penicillins Unknown (See Comments) and Anaphylaxis     Throat closes up and can not breath  Throat closes up and can not breath        Review of Systems   Constitutional: Negative for activity change, appetite change, fatigue, fever and unexpected weight change.   Gastrointestinal: Negative for abdominal pain, nausea and vomiting.   Genitourinary: Positive for menstrual problem and vaginal bleeding. Negative for vaginal discharge and vaginal pain.   Hematological: Does not bruise/bleed easily.   Psychiatric/Behavioral: Negative for agitation.         OBJECTIVE:   Vitals:    08/29/19 1333   BP: 130/82   Pulse: 101   Weight: 67.1 kg (148 lb)   Height: 162.6 cm (64.02\")      Physical Exam   Constitutional: She is oriented to person, place, and time. She appears well-developed and well-nourished. No distress.   HENT:   Head: Normocephalic and atraumatic.   Eyes: EOM are normal. No scleral icterus.   Neck: Normal range of motion.   Cardiovascular: Normal rate and regular rhythm.   Pulmonary/Chest: Effort normal. No respiratory distress.   Abdominal: Soft. She exhibits no distension.   Genitourinary: Uterus normal and cervix normal. There is no rash, tenderness, lesion, injury or Bartholin's cyst on the right labia. There is no rash, tenderness, lesion, injury or Bartholin's cyst on the left labia. Cervix does not exhibit motion tenderness. Right adnexum displays no mass, no tenderness and no fullness. Right adnexum is present.Left adnexum displays no mass, no tenderness and no fullness. Left adnexum is present.Vagina exhibits no lesion and no loss of rugae. There is bleeding in the vagina. No erythema or tenderness in the vagina. No foreign body in the vagina. No signs of injury around the vagina. No vaginal discharge found.   Musculoskeletal: Normal range of motion. "   Neurological: She is alert and oriented to person, place, and time.   Skin: Skin is warm and dry. No rash noted. She is not diaphoretic. No erythema.   Psychiatric: She has a normal mood and affect. Her behavior is normal. Judgment and thought content normal.       ASSESSMENT/PLAN:     ICD-10-CM ICD-9-CM   1. Bleeding in early pregnancy O20.9 640.90     Reviewed with patient that if she were to have heavier bleeding, would recommend immediate follow-up with ultrasound.  Reviewed implications of subchorionic hemorrhage.  Hers was noted to be small.  We discussed that some pregnancies: 2 normal deliveries of the subchorionic hemorrhage others may increase the risk for miscarriage.  As her last ultrasound was only 2 days ago, recommend ultrasound follow-up next Tuesday as long as bleeding is stable or improved.  A+  Patient has plans to go out of town next week.  Discussed that if ultrasound findings on Tuesday are stable and patient's bleeding has improved, this is reasonable.  Anyone who bleeds in pregnancy has a threatened miscarriage and so if she were to have bleeding and be away from town she would need to seek follow-up locally.    Abnormal Pap smear: Last Pap smear was HPV positive.  Will need baseline OB labs and repeat Pap smear if ultrasound findings stable.    Return in about 5 days (around 9/3/2019) for US at [facility], add on for visit.

## 2019-08-30 ENCOUNTER — TELEPHONE (OUTPATIENT)
Dept: OBSTETRICS AND GYNECOLOGY | Facility: CLINIC | Age: 39
End: 2019-08-30

## 2019-08-30 NOTE — TELEPHONE ENCOUNTER
Patient's  called.  Upon driving home from work, patient with shooting pains in back and pains in stomach shooting up into chest.  No bleeding or spotting.  Was seen in ER and office.  I told them it was reasonable to watch for an hour or two.  If no improvement, patient should consider being seen in ER.

## 2019-09-03 ENCOUNTER — INITIAL PRENATAL (OUTPATIENT)
Dept: OBSTETRICS AND GYNECOLOGY | Facility: CLINIC | Age: 39
End: 2019-09-03

## 2019-09-03 ENCOUNTER — PROCEDURE VISIT (OUTPATIENT)
Dept: OBSTETRICS AND GYNECOLOGY | Facility: CLINIC | Age: 39
End: 2019-09-03

## 2019-09-03 VITALS — BODY MASS INDEX: 25.73 KG/M2 | SYSTOLIC BLOOD PRESSURE: 119 MMHG | DIASTOLIC BLOOD PRESSURE: 80 MMHG | WEIGHT: 150 LBS

## 2019-09-03 DIAGNOSIS — O36.80X0 ENCOUNTER TO DETERMINE FETAL VIABILITY OF PREGNANCY, SINGLE OR UNSPECIFIED FETUS: Primary | ICD-10-CM

## 2019-09-03 DIAGNOSIS — Z34.80 SUPERVISION OF OTHER NORMAL PREGNANCY, ANTEPARTUM: Primary | ICD-10-CM

## 2019-09-03 DIAGNOSIS — Z87.59 HISTORY OF SPONTANEOUS ABORTION: ICD-10-CM

## 2019-09-03 PROCEDURE — 76817 TRANSVAGINAL US OBSTETRIC: CPT | Performed by: OBSTETRICS & GYNECOLOGY

## 2019-09-03 PROCEDURE — 0501F PRENATAL FLOW SHEET: CPT | Performed by: OBSTETRICS & GYNECOLOGY

## 2019-09-03 NOTE — PROGRESS NOTES
Initial OB Visit    Chief Complaint   Patient presents with   • Initial Prenatal Visit     pt reports nausea a vomitng. Denies spotting but yes cramping.         Margaret Meredith is being seen today for her first obstetrical visit.  She is a 38 y.o.    10w4d gestation.   FOB: Edgardo, best  This is not a planned pregnancy.  She was diagnosed with a subchorionic hematoma in ED.    Reports she has a h/o Factor V Leiden, no h/o clot  OB History    Para Term  AB Living   4 1 1   2 1   SAB TAB Ectopic Molar Multiple Live Births   1 1       1      # Outcome Date GA Lbr Severino/2nd Weight Sex Delivery Anes PTL Lv   4 Current            3 Term 06 42w0d  3033 g (6 lb 11 oz) F Vag-Spont   SAKSHI   2 SAB      SAB      1 TAB      TAB           Current obstetric complaints: cramping, no bleeding   Prior obstetric issues, potential pregnancy concerns: denies  Family history of genetic issues (includes FOB): denies  Prior infections concerning in pregnancy (Rash, fever since LMP): denies  Varicella Hx:immune by history  Prior genetic testing: unsure  History of abnormal pap smears: yes - h/o HPV in 2018 May  History of STIs: yes, history of HSV in self - no recent outbreaks  Reviewed h/o factor v leiden. Was tested in [place] with last pregnancy.  Reports was never on blood thinner.      Past Medical History:   Diagnosis Date   • Abnormal Pap smear of cervix    • Factor V Leiden carrier (CMS/[facility])    • Herpes    • HPV (human papilloma virus) infection        Past Surgical History:   Procedure Laterality Date   • TONSILLECTOMY           Current Outpatient Medications:   •  Prenatal Vit-Fe Fumarate-FA (PRENATAL, CLASSIC, VITAMIN) 28-0.8 MG tablet tablet, Take  by mouth Daily., Disp: , Rfl:     Allergies   Allergen Reactions   • Penicillins Unknown (See Comments) and Anaphylaxis     Throat closes up and can not breath  Throat closes up and can not breath       Social History     Socioeconomic History   • Marital  status:      Spouse name: Not on file   • Number of children: Not on file   • Years of education: Not on file   • Highest education level: Not on file   Tobacco Use   • Smoking status: Former Smoker     Start date: 4/9/2018   • Smokeless tobacco: Never Used   Substance and Sexual Activity   • Alcohol use: No   • Drug use: No   • Sexual activity: Yes     Partners: Male     Birth control/protection: None       Family History   Problem Relation Age of Onset   • Breast cancer Mother 35   • Diabetes Maternal Grandmother    • Ovarian cancer Neg Hx    • Uterine cancer Neg Hx    • Colon cancer Neg Hx    • Thyroid disease Neg Hx    • Deep vein thrombosis Neg Hx    • Pulmonary embolism Neg Hx        Review of systems     Constitutional: negative for chills, fevers and negative for fatigue  Eyes: negative  Ears, nose, mouth, throat, and face: negative for hearing loss and nasal congestion  Respiratory: negative for asthma and wheezing  Cardiovascular: negative for chest pain and dyspnea  Gastrointestinal: negative for dyspepsia, dysphagia abdominal pain  Genitourinary:negative for urinary incontinence  Integument/breast: negative for breast lump  Hematologic/lymphatic: negative for bleeding  Musculoskeletal:negative for aches  Neurological: negative for numbness/tingling  Behavioral/Psych: negative for anhedonia  Allergic/Immunologic: negative for rash, allergy         Objective    /80   Wt 68 kg (150 lb)   LMP 06/25/2019 (Exact Date)   BMI 25.73 kg/m²       General Appearance:    Alert, cooperative, in no acute distress, habitus normal   Head:    Normocephalic, without obvious abnormality, atraumatic   Eyes:            Lids and lashes normal, conjunctivae and sclerae normal, no   icterus, no pallor, corneas clear   Ears:    Ears appear intact with no abnormalities noted       Neck:   No adenopathy, supple, trachea midline, no thyromegaly   Back:     No kyphosis present, no scoliosis present,                        Lungs:     Clear to auscultation,respirations regular, even and                   unlabored    Heart:    Regular rhythm and normal rate, normal S1 and S2, no            murmur, no gallop, no rub, no click   Breast Exam:    No masses, No nipple discharge   Abdomen:     Normal bowel sounds, no masses, no organomegaly, soft        non-tender, non-distended, no guarding, no rebound                 tenderness   Genitalia:    Vulva - BUS-WNL, NEFG    Vagina - No discharge, No bleeding    Cervix - No Lesions, closed     Uterus - Consistent with 10 weeks    Adnexa - No masses, NT    Pelvimetry - clinically adequate, gynecoid pelvis     Extremities:   Moves all extremities well, no edema, no cyanosis, no              redness   Pulses:   Pulses palpable and equal bilaterally   Skin:   No bleeding, bruising or rash   Lymph nodes:   No palpable adenopathy   Neurologic:   Sensation intact, A&O times 3      Assessment  Pregnancy at 10w4d  Subchorionic hematoma  Factor V leiden, no h/o clot   Anterior fibroid     Plan    Initial labs drawn, GC/CHL screen done  Patient is on Prenatal vitamins  Problem list reviewed and updated.  Reviewed routine prenatal care with the office to include but not limited to:   Zika (travel restrictions/ok to use insect repellant), not to changing cat litter, food restrictions, avoidance of alcohol, tobacco and drugs and saunas/hot tubs, anticipated weight gain/nutrition requirements.  Reviewed nature of practice and hospital.  Reviewed recommended follow up, importance of compliance with care. We reviewed testing in pregnancy including HIV testing and urine drug screen.    Reviewed aneuploidy screening and CF/SMA screening.  We reviewed limitations of testing, possibility of false positive/negative results, possible need for other tests as indicated.  Reviewed AMA status and recommended aneuploidy testing but pt prefers to discuss with FOB before decident.    Factor V Leiden: Patient believes she  is a heterozygote.  She had testing 13 years ago with her last pregnancy in [place].  She was never on blood thinners.  She has no known history of a clot.  Surveillance recommended for now.    Counseled on limitations of ultrasound in pregnancy in detecting aneuploidy/fetal anomalies    We reviewed that at this time, her BMI is classified as BMI 25.0-29.9        Classification: overweight.  We reviewed that this is classified as overweight for age.  In pregnancy, her recommended weight gain is 15-25lbs.  In the first trimester, caloric demand is typically not increased.  In the second and third trimester, the increased demand is approximately 350 and 450 calories respectively.    All questions answered.   No Follow-up on file.      Shelley Mireles MD

## 2019-09-03 NOTE — PATIENT INSTRUCTIONS
Travel During Pregnancy:  • Always use seatbelts.  A lap belt should be worn below the abdomen (across the hips) and the shoulder belt should be worn across the center of your chest (between the breasts) away from your neck.  Do not put the shoulder belt under your arm or behind your back.  Pull any slack out of the belt.  • Air travel is safe in most uncomplicated pregnancies, but we do not recommend air travel past 36 weeks.  Airlines may also have restrictions, so check with your airline before flying.  For some international flights, the travel cut off may be as early as 28 weeks gestation, and some airlines may require letters from your physician.  • When going on long trips in car, plane, train, or bus, frequent ambulation is important to prevent blood clots in legs and/or lungs.  The following may help with prevention of blood clots in legs: Drink lots of fluid, wear loose-fitting clothing, walk and stretch at regular intervals.    • Avoid areas with Zika outbreaks.  For the latest information, you may visit: www.cdc.gov/travel/notices/.  Resources: , , Committee Opinion 455  Nutrition during pregnancy  • Average weight gain during pregnancy is based on your pre-pregnancy body mass index (BMI).  See below for recommended weight gain:  o Underweight (BMI <18.5), we recommend 28 to 40lb weight gain  o Normal weight (BMI 18.5 to 24.9), we recommend a 25 to 35lb weight gain  o Overweight (BMI 25-29.9), we recommend a 15 to 25lb weight gain  o Obese (BMI >30), we recommend keeping weight gain under 20 lbs.    • You should speak with your physician regarding your specific weight goals for this pregnancy.   • Foods to avoid include:   o Fish: avoid certain types of fish such as shark, swordfish, leanna mackerel, tilefish.  Limit white (albacore) tuna to 6 oz per week.  Choose fish and shellfish such as shrimp, salmon, catfish, [place].  o Food-borne illness: Pregnant women are much more likely to get  Listeriosis than non-pregnant women.  To help prevent this, avoid eating unpasteurized milk and unpasteurized milk products, hot dogs/lunch meat/cold cuts unless they are heated until steaming right before serving, refrigerated meat spreads, refrigerated smoked seafood, raw or undercooked seafood/eggs/meat.   • Vitamin D helps development of the baby’s bones and teeth.  Good sources of Vitamin D include milk fortified with Vitamin D and fatty fish such as salmon.    • Folic acid, also known as folate, helps develop the baby’s brain and spine.  You should make sure your vitamin contains extra folic acid - at least 400mcg.    • Iron helps make red blood cells.  You need to make extra red blood cell sin pregnancy.  We recommend eating Iron-rich foods such as lean red meat, poultry, fish, dried beans and peas, iron-fortified cereals, and prune juice.  You may be recommended an iron supplement.  If so, it is absorbed more easily if taken with vitamin C-rich foods such as citrus fruits or tomatoes.    • It is important to eat a well balanced diet.  A good recourse for nutrition recommendations is: www.choosemyplate.gov.  • Limit caffeine intake to 200mg daily.  Some coffees/teas/sodas have very different levels of caffeine per serving, so check the nutrition labeling.   Resources: , Committee Opinion 548  Exercise during pregnancy  • If you are healthy and your pregnancy is normal, it is safe to continue or start most types of exercise.   Physical activity does not increase your risk of miscarriage, low birth weight or early delivery, but you should discuss specific limitations or any complications with your physician.    • Benefits of exercise during pregnancy include: reduced back pain, less constipation, promotes overall health and healthy weight gain which may decrease risks for certain pregnancy complications such as diabetes and/or preeclampsia.  • The CDC recommends 150 minutes of moderate intensity aerobic  exercise per week.  Moderate intensity means that you are moving enough to raise your heart rate and start sweating, but you can talk normally.  Brisk walking, swimming/water workouts, modified yoga/pilates, and use of elliptical machines and/or stationary bikes are examples of aerobic activity.    • Precautions:  o Stay hydrated.  Drink plenty of water before, during and after your workout  o Wear supportive clothing such as a sports bra  o Do not become overheated.  Do not exercise outside when it is very hot or humid  o Avoid lying flat on your back as much as possible  o AVOID contact sports, skydiving, sports that risk falling (such as skiing, surfing, off-road cycling, gymnastics, horseback riding), hot yoga/hot pilates, scuba diving  • Stop exercising if you experience: bleeding from the vagina, feeling dizzy/faint, shortness of breath before starting exercise, chest pain, headache, muscle weakness, calf pain or swelling, regular uterine contractions, fluid leaking from the vagina.    • Postpartum exercise: Continuing exercise after you deliver your baby will help boost your energy, strengthen muscles, promote better sleep, and relieve stress.  It also may be useful in preventing postpartum depression.  150 minutes of moderate-intensity aerobic activity is recommended.  Types of exercise and when you can start a regular exercise routine may be limited by the type of delivery you had.  Please discuss with your physician prior to resuming or starting exercise.    Resources: ,   Back pain during pregnancy  • Back pain is very common during pregnancy.  It may arise due to strain on your back muscles, weakness of the abdominal muscles, and pregnancy hormones.    • To prevent back pain:  o Wear shoes with good support. Flat shoes and high heels may not have good arch support.  o Consider a firm mattress  o Use good lifting practices.  Do not bend from the waist to pick things up.  Squat down and bend  "your knees, keeping a straight back.  o Sit in chairs with good back support or use a small pillow behind your lower back.    o Sleep on your side with one or two pillows between your legs  • To ease back pain:   o Exercise can help stretch strained muscles and strengthen weak muscles to promote good posture  • Contact your health care provider with severe pain, pain that persists for more than 2 weeks, fever, burning during urination, or vaginal bleeding.  Resources:     The following are CPT codes for the optional tests in pregnancy:  Cystic fibrosis screenin  Spinal Muscular atrophy screening 06075  InformaSeq with XY (aneuploidy screening) 27704    The ICD 10 code for most pregnancies is Z34.90     Nausea/vomiting in pregnancy:  To help with nausea and vomiting you may try the following over the counter products:  Astrid chews, astrid tea, astrid gum  Mint tea, peppermint gum or candy  B6/Doxylamine: to be taken daily, not just when symptoms occur  Pyridoxine (also known as B6): 10 to 25 mg orally every six to eight hours; the maximum treatment dose suggested for pregnant women is 200 mg/day.  Doxylamine (available in some over-the-counter sleeping pills (eg, Unisom Sleep Tabs) and as an antihistamine chewable tablet (Aldex AN)). One-half of the 25 mg over-the-counter tablet or two chewable 5 mg tablets can be used off-label as an antiemetic  · The Association of Profess[employer] of Gyne[employer] and Obstetric[employer] has released a smart phone application that can help you monitor and manage your symptoms.  The Application is \"Managing NVP,\" and has a green icon that says \"APGO WELLMOM.\"    If these do not work, we may need to try prescription medications.    Please contact us if you are unable to tolerate liquids (even sips of water) for over six to eight hours, have weight loss of over 10% of your body weight, blood in vomit, fever (temp of 100.4 or higher), or other concerning symptoms arise.            "

## 2019-09-04 LAB
ABO GROUP BLD: (no result)
AMPHETAMINES UR QL SCN: NEGATIVE NG/ML
BARBITURATES UR QL SCN: NEGATIVE NG/ML
BASOPHILS # BLD AUTO: 0 X10E3/UL (ref 0–0.2)
BASOPHILS NFR BLD AUTO: 0 %
BENZODIAZ UR QL: NEGATIVE NG/ML
BLD GP AB SCN SERPL QL: NEGATIVE
BZE UR QL: NEGATIVE NG/ML
CANNABINOIDS UR QL SCN: NEGATIVE NG/ML
EOSINOPHIL # BLD AUTO: 0.4 X10E3/UL (ref 0–0.4)
EOSINOPHIL NFR BLD AUTO: 3 %
ERYTHROCYTE [DISTWIDTH] IN BLOOD BY AUTOMATED COUNT: 14.8 % (ref 12.3–15.4)
HBV SURFACE AG SERPL QL IA: NEGATIVE
HCT VFR BLD AUTO: 36.7 % (ref 34–46.6)
HCV AB S/CO SERPL IA: <0.1 S/CO RATIO (ref 0–0.9)
HGB BLD-MCNC: 12.1 G/DL (ref 11.1–15.9)
HIV 1+2 AB+HIV1 P24 AG SERPL QL IA: NON REACTIVE
IMM GRANULOCYTES # BLD AUTO: 0.1 X10E3/UL (ref 0–0.1)
IMM GRANULOCYTES NFR BLD AUTO: 1 %
LYMPHOCYTES # BLD AUTO: 3 X10E3/UL (ref 0.7–3.1)
LYMPHOCYTES NFR BLD AUTO: 23 %
MCH RBC QN AUTO: 29.2 PG (ref 26.6–33)
MCHC RBC AUTO-ENTMCNC: 33 G/DL (ref 31.5–35.7)
MCV RBC AUTO: 89 FL (ref 79–97)
METHADONE UR QL SCN: NEGATIVE NG/ML
MONOCYTES # BLD AUTO: 1 X10E3/UL (ref 0.1–0.9)
MONOCYTES NFR BLD AUTO: 7 %
NEUTROPHILS # BLD AUTO: 8.7 X10E3/UL (ref 1.4–7)
NEUTROPHILS NFR BLD AUTO: 66 %
OPIATES UR QL: NEGATIVE NG/ML
PCP UR QL: NEGATIVE NG/ML
PLATELET # BLD AUTO: 334 X10E3/UL (ref 150–450)
PROPOXYPH UR QL SCN: NEGATIVE NG/ML
RBC # BLD AUTO: 4.14 X10E6/UL (ref 3.77–5.28)
RH BLD: POSITIVE
RPR SER QL: NON REACTIVE
RUBV IGG SERPL IA-ACNC: 10.6 INDEX
WBC # BLD AUTO: 13.1 X10E3/UL (ref 3.4–10.8)

## 2019-09-05 LAB
BACTERIA UR CULT: NORMAL
BACTERIA UR CULT: NORMAL
CYTOLOGIST CVX/VAG CYTO: NORMAL
CYTOLOGY CVX/VAG DOC CYTO: NORMAL
CYTOLOGY CVX/VAG DOC THIN PREP: NORMAL
DX ICD CODE: NORMAL
HIV 1 & 2 AB SER-IMP: NORMAL
HPV I/H RISK 4 DNA CVX QL PROBE+SIG AMP: NEGATIVE
OTHER STN SPEC: NORMAL
STAT OF ADQ CVX/VAG CYTO-IMP: NORMAL

## 2019-09-06 LAB
C TRACH RRNA VAG QL NAA+PROBE: NEGATIVE
N GONORRHOEA RRNA VAG QL NAA+PROBE: NEGATIVE
T VAGINALIS RRNA VAG QL NAA+PROBE: NEGATIVE

## 2019-09-11 ENCOUNTER — TELEPHONE (OUTPATIENT)
Dept: OBSTETRICS AND GYNECOLOGY | Facility: CLINIC | Age: 39
End: 2019-09-11

## 2019-09-11 NOTE — TELEPHONE ENCOUNTER
----- Message from Shelley Mireles MD sent at 9/5/2019  6:28 PM EDT -----  Please contact patient and let her know that her pap smear was normal and HPV testing was negative. Labs that have resulted thus far look within normal limits.  Thanks!    09/11/19  Called pt twice in a row, when calling number an automated msg states this number may no longer be in service.    Mailed letter to update contact number.

## 2019-10-21 ENCOUNTER — TELEPHONE (OUTPATIENT)
Dept: OBSTETRICS AND GYNECOLOGY | Facility: CLINIC | Age: 39
End: 2019-10-21

## 2019-10-21 NOTE — TELEPHONE ENCOUNTER
Ob pt called and had to r/s her appt for Nov 6th due to insurance. Do you want me to add an anatomy US for this visit.

## 2019-11-06 ENCOUNTER — PROCEDURE VISIT (OUTPATIENT)
Dept: OBSTETRICS AND GYNECOLOGY | Facility: CLINIC | Age: 39
End: 2019-11-06

## 2019-11-06 ENCOUNTER — ROUTINE PRENATAL (OUTPATIENT)
Dept: OBSTETRICS AND GYNECOLOGY | Facility: CLINIC | Age: 39
End: 2019-11-06

## 2019-11-06 VITALS — BODY MASS INDEX: 27.96 KG/M2 | SYSTOLIC BLOOD PRESSURE: 126 MMHG | WEIGHT: 163 LBS | DIASTOLIC BLOOD PRESSURE: 80 MMHG

## 2019-11-06 DIAGNOSIS — Z34.80 SUPERVISION OF OTHER NORMAL PREGNANCY, ANTEPARTUM: Primary | ICD-10-CM

## 2019-11-06 DIAGNOSIS — Z36.89 ENCOUNTER FOR FETAL ANATOMIC SURVEY: Primary | ICD-10-CM

## 2019-11-06 PROCEDURE — 0502F SUBSEQUENT PRENATAL CARE: CPT | Performed by: OBSTETRICS & GYNECOLOGY

## 2019-11-06 PROCEDURE — 76805 OB US >/= 14 WKS SNGL FETUS: CPT | Performed by: OBSTETRICS & GYNECOLOGY

## 2019-11-06 NOTE — PROGRESS NOTES
Chief Complaint   Patient presents with   • Routine Prenatal Visit      Margaret Meredith is a 39 y.o.  at 19w5d   Denies cramping, vaginal bleeding   Notes fetal movement    /80   Wt 73.9 kg (163 lb)   LMP 2019 (Exact Date)   BMI 27.96 kg/m²    Gen: NAD, well appearing  Abd: gravid, nontender  See OB Flowsheet    ASSESSMENT:   1. IUP at 19w5d   2. Factor V Leiden heterozygote  3. Anterior fibroid  4. Lapse in care secondary to insurance coverage  PLAN:  Declines flu.  Counseled on risks of flu in pregnancy.  Counseled on benefits of vaccination.  Counseled on use of Tamiflu for prophylaxis if exposed to flu or for treatment with onset of flu like symptoms.    Reviewed common second trimester symptoms.  Reviewed precautions for signs of  labor, anticipated fetal movements.     Reviewed normal anatomy US, limitations of US in detecting aneuploidy/anomalies.    Declines cell free DNA, aware of increased risk of aneuploidy in AMA status.   Return in about 4 weeks (around 2019).  No orders of the defined types were placed in this encounter.

## 2019-12-18 ENCOUNTER — ROUTINE PRENATAL (OUTPATIENT)
Dept: OBSTETRICS AND GYNECOLOGY | Facility: CLINIC | Age: 39
End: 2019-12-18

## 2019-12-18 VITALS — DIASTOLIC BLOOD PRESSURE: 76 MMHG | SYSTOLIC BLOOD PRESSURE: 121 MMHG | WEIGHT: 171 LBS | BODY MASS INDEX: 29.34 KG/M2

## 2019-12-18 DIAGNOSIS — Z34.80 SUPERVISION OF OTHER NORMAL PREGNANCY, ANTEPARTUM: Primary | ICD-10-CM

## 2019-12-18 PROCEDURE — 0502F SUBSEQUENT PRENATAL CARE: CPT | Performed by: OBSTETRICS & GYNECOLOGY

## 2019-12-18 RX ORDER — FAMOTIDINE 20 MG/1
20 TABLET, FILM COATED ORAL DAILY
Qty: 30 TABLET | Refills: 1 | Status: SHIPPED | OUTPATIENT
Start: 2019-12-18 | End: 2020-12-17

## 2019-12-18 NOTE — PROGRESS NOTES
Chief Complaint   Patient presents with   • Routine Prenatal Visit     pt reports heartburn and acid reflex      Margaret Meredith is a 39 y.o.  at 25w5d   Reports taking tums for GERD, has not improved  Denies cramping, vaginal bleeding   Notes normal fetal movement    /76   Wt 77.6 kg (171 lb)   LMP 2019 (Exact Date)   BMI 29.34 kg/m²    Gen: NAD, well appearing  Abd: gravid, nontender  See OB Flowsheet    ASSESSMENT:   1. IUP at 25w5d   2. Factor V Leiden heterozygote, no h/o clot  3. AMA  4. GERD  PLAN:  Recommend pepcid, tums for GERD  Reviewed travel precautions/DVT prevention as patient travels often.  Declines cell free DNA, aware of limitations of US in detecting aneuploidy.  Reviewed common second trimester symptoms.  Reviewed precautions for signs of  labor, anticipated fetal movements.       Return in about 4 weeks (around 1/15/2020) for GCT, OB visit.  Orders Placed This Encounter   Procedures   • Gestational Screen 1 Hr ([facility])   • CBC (No Diff)

## 2020-01-14 ENCOUNTER — ROUTINE PRENATAL (OUTPATIENT)
Dept: OBSTETRICS AND GYNECOLOGY | Facility: CLINIC | Age: 40
End: 2020-01-14

## 2020-01-14 VITALS — BODY MASS INDEX: 30.37 KG/M2 | WEIGHT: 177 LBS | SYSTOLIC BLOOD PRESSURE: 120 MMHG | DIASTOLIC BLOOD PRESSURE: 80 MMHG

## 2020-01-14 DIAGNOSIS — Z34.80 SUPERVISION OF OTHER NORMAL PREGNANCY, ANTEPARTUM: Primary | ICD-10-CM

## 2020-01-14 PROCEDURE — 0502F SUBSEQUENT PRENATAL CARE: CPT | Performed by: OBSTETRICS & GYNECOLOGY

## 2020-01-14 NOTE — PROGRESS NOTES
Chief Complaint   Patient presents with   • Routine Prenatal Visit     pt has perfomred 1 hr glucose test today.       Margaret Meredith is a 39 y.o.  at 29w4d   Reports GERD improved with Pepcid  Denies cramping, vaginal bleeding   Notes normal fetal movement    /80   Wt 80.3 kg (177 lb)   LMP 2019 (Exact Date)   BMI 30.37 kg/m²    Gen: NAD, well appearing  Abd: gravid, nontender  See OB Flowsheet    ASSESSMENT:   1. IUP at 29w4d   2. Factor V Leiden heterozygote, no h/o clot  3. AMA  4. GERD  PLAN:  GCT today.   Recommend Tdap next visit  Reviewed common symptoms of the third trimester.  Counseled on labor precautions and anticipated fetal movements.  Reviewed kick counts.  Patient is aware of the location of L&D.     Return in about 2 weeks (around 2020).  No orders of the defined types were placed in this encounter.

## 2020-01-15 ENCOUNTER — TELEPHONE (OUTPATIENT)
Dept: OBSTETRICS AND GYNECOLOGY | Facility: CLINIC | Age: 40
End: 2020-01-15

## 2020-01-15 LAB
ERYTHROCYTE [DISTWIDTH] IN BLOOD BY AUTOMATED COUNT: 12.9 % (ref 12.3–15.4)
GLUCOSE 1H P 50 G GLC PO SERPL-MCNC: 124 MG/DL (ref 65–179)
HCT VFR BLD AUTO: 28.4 % (ref 34–46.6)
HGB BLD-MCNC: 9.4 G/DL (ref 12–15.9)
MCH RBC QN AUTO: 28.8 PG (ref 26.6–33)
MCHC RBC AUTO-ENTMCNC: 33.1 G/DL (ref 31.5–35.7)
MCV RBC AUTO: 87.1 FL (ref 79–97)
PLATELET # BLD AUTO: 261 10*3/MM3 (ref 140–450)
RBC # BLD AUTO: 3.26 10*6/MM3 (ref 3.77–5.28)
WBC # BLD AUTO: 11.77 10*3/MM3 (ref 3.4–10.8)

## 2020-01-15 RX ORDER — FERROUS SULFATE 325(65) MG
325 TABLET ORAL
Qty: 90 TABLET | Refills: 1 | Status: SHIPPED | OUTPATIENT
Start: 2020-01-15 | End: 2020-08-28

## 2020-01-15 NOTE — TELEPHONE ENCOUNTER
----- Message from Shelley Mireles MD sent at 1/15/2020  8:58 AM EST -----  Please let patient know that her one hour glucose testing (gestational diabetes testing) was normal.  Her blood count was low, and I would recommend supplementing with Iron.  Thanks!    01/15/20  Called patient to inform results, no answer. Left a message to return call.

## 2020-01-28 ENCOUNTER — ROUTINE PRENATAL (OUTPATIENT)
Dept: OBSTETRICS AND GYNECOLOGY | Facility: CLINIC | Age: 40
End: 2020-01-28

## 2020-01-28 VITALS — BODY MASS INDEX: 31.22 KG/M2 | SYSTOLIC BLOOD PRESSURE: 117 MMHG | WEIGHT: 182 LBS | DIASTOLIC BLOOD PRESSURE: 76 MMHG

## 2020-01-28 DIAGNOSIS — Z34.80 SUPERVISION OF OTHER NORMAL PREGNANCY, ANTEPARTUM: Primary | ICD-10-CM

## 2020-01-28 LAB
GLUCOSE UR STRIP-MCNC: NEGATIVE MG/DL
PROT UR STRIP-MCNC: ABNORMAL MG/DL

## 2020-01-28 PROCEDURE — 0502F SUBSEQUENT PRENATAL CARE: CPT | Performed by: OBSTETRICS & GYNECOLOGY

## 2020-01-28 NOTE — PROGRESS NOTES
Chief Complaint   Patient presents with   • Routine Prenatal Visit     pt reports swollen feet and hands      Margaret Meredith is a 39 y.o.  at 31w4d   Reports she has had some swelling in her hands and feet since she started moving. Almost done with the move.  Denies any calf pain. Swelling has been symmetric  Denies cramping, vaginal bleeding   Notes fetal movement    /76   Wt 82.6 kg (182 lb)   LMP 2019 (Exact Date)   BMI 31.22 kg/m²    Gen: NAD, well appearing  Abd: gravid, nontender  See OB Flowsheet    ASSESSMENT:   1. IUP at 31w4d   2. Lower extremity edema  3. H/o Factor V Leiden, no clot  PLAN:  Reviewed lower extremity edema - likely dependent. No signs of DVT.  DVE precautions reviewed.   Growth US next visit for obesity  Reviewed common symptoms of the third trimester.  Counseled on labor precautions and anticipated fetal movements.  Reviewed kick counts.  Patient is aware of the location of L&D.       Return in about 2 weeks (around 2020) for growth US, OB visit.  No orders of the defined types were placed in this encounter.

## 2020-02-06 ENCOUNTER — HOSPITAL ENCOUNTER (OUTPATIENT)
Facility: HOSPITAL | Age: 40
Setting detail: OBSERVATION
Discharge: HOME | End: 2020-02-07
Attending: OBSTETRICS & GYNECOLOGY | Admitting: OBSTETRICS & GYNECOLOGY

## 2020-02-06 PROBLEM — Z34.90 PREGNANCY: Status: ACTIVE | Noted: 2020-02-06

## 2020-02-07 VITALS
WEIGHT: 180.6 LBS | DIASTOLIC BLOOD PRESSURE: 67 MMHG | BODY MASS INDEX: 30.83 KG/M2 | RESPIRATION RATE: 16 BRPM | SYSTOLIC BLOOD PRESSURE: 110 MMHG | HEART RATE: 88 BPM | TEMPERATURE: 98.8 F | HEIGHT: 64 IN

## 2020-02-07 LAB
ALBUMIN SERPL-MCNC: 3.4 G/DL (ref 3.5–5.2)
ALBUMIN/GLOB SERPL: 1.3 G/DL
ALP SERPL-CCNC: 121 U/L (ref 39–117)
ALT SERPL W P-5'-P-CCNC: 6 U/L (ref 1–33)
ANION GAP SERPL CALCULATED.3IONS-SCNC: 15 MMOL/L (ref 5–15)
AST SERPL-CCNC: 9 U/L (ref 1–32)
BASOPHILS # BLD AUTO: 0.04 10*3/MM3 (ref 0–0.2)
BASOPHILS NFR BLD AUTO: 0.4 % (ref 0–1.5)
BILIRUB SERPL-MCNC: <0.2 MG/DL (ref 0.2–1.2)
BUN BLD-MCNC: 9 MG/DL (ref 6–20)
BUN/CREAT SERPL: 18.4 (ref 7–25)
CALCIUM SPEC-SCNC: 8.8 MG/DL (ref 8.6–10.5)
CHLORIDE SERPL-SCNC: 102 MMOL/L (ref 98–107)
CO2 SERPL-SCNC: 18 MMOL/L (ref 22–29)
CREAT BLD-MCNC: 0.49 MG/DL (ref 0.57–1)
DEPRECATED RDW RBC AUTO: 42 FL (ref 37–54)
EOSINOPHIL # BLD AUTO: 0.31 10*3/MM3 (ref 0–0.4)
EOSINOPHIL NFR BLD AUTO: 2.9 % (ref 0.3–6.2)
ERYTHROCYTE [DISTWIDTH] IN BLOOD BY AUTOMATED COUNT: 13.7 % (ref 12.3–15.4)
GFR SERPL CREATININE-BSD FRML MDRD: 141 ML/MIN/1.73
GLOBULIN UR ELPH-MCNC: 2.7 GM/DL
GLUCOSE BLD-MCNC: 76 MG/DL (ref 65–99)
HCT VFR BLD AUTO: 28.2 % (ref 34–46.6)
HGB BLD-MCNC: 9.2 G/DL (ref 12–15.9)
IMM GRANULOCYTES # BLD AUTO: 0.06 10*3/MM3 (ref 0–0.05)
IMM GRANULOCYTES NFR BLD AUTO: 0.6 % (ref 0–0.5)
LYMPHOCYTES # BLD AUTO: 2.63 10*3/MM3 (ref 0.7–3.1)
LYMPHOCYTES NFR BLD AUTO: 24.9 % (ref 19.6–45.3)
MCH RBC QN AUTO: 27.6 PG (ref 26.6–33)
MCHC RBC AUTO-ENTMCNC: 32.6 G/DL (ref 31.5–35.7)
MCV RBC AUTO: 84.7 FL (ref 79–97)
MONOCYTES # BLD AUTO: 0.79 10*3/MM3 (ref 0.1–0.9)
MONOCYTES NFR BLD AUTO: 7.5 % (ref 5–12)
NEUTROPHILS # BLD AUTO: 6.74 10*3/MM3 (ref 1.7–7)
NEUTROPHILS NFR BLD AUTO: 63.7 % (ref 42.7–76)
NRBC BLD AUTO-RTO: 0 /100 WBC (ref 0–0.2)
PLATELET # BLD AUTO: 248 10*3/MM3 (ref 140–450)
PMV BLD AUTO: 12.8 FL (ref 6–12)
POTASSIUM BLD-SCNC: 4 MMOL/L (ref 3.5–5.2)
PROT SERPL-MCNC: 6.1 G/DL (ref 6–8.5)
RBC # BLD AUTO: 3.33 10*6/MM3 (ref 3.77–5.28)
SODIUM BLD-SCNC: 135 MMOL/L (ref 136–145)
WBC NRBC COR # BLD: 10.57 10*3/MM3 (ref 3.4–10.8)

## 2020-02-07 PROCEDURE — G0378 HOSPITAL OBSERVATION PER HR: HCPCS

## 2020-02-07 PROCEDURE — 85025 COMPLETE CBC W/AUTO DIFF WBC: CPT | Performed by: OBSTETRICS & GYNECOLOGY

## 2020-02-07 PROCEDURE — 59025 FETAL NON-STRESS TEST: CPT | Performed by: OBSTETRICS & GYNECOLOGY

## 2020-02-07 PROCEDURE — 80053 COMPREHEN METABOLIC PANEL: CPT | Performed by: OBSTETRICS & GYNECOLOGY

## 2020-02-07 PROCEDURE — 59025 FETAL NON-STRESS TEST: CPT

## 2020-02-07 PROCEDURE — 96360 HYDRATION IV INFUSION INIT: CPT

## 2020-02-07 RX ORDER — SODIUM CHLORIDE, SODIUM LACTATE, POTASSIUM CHLORIDE, CALCIUM CHLORIDE 600; 310; 30; 20 MG/100ML; MG/100ML; MG/100ML; MG/100ML
1000 INJECTION, SOLUTION INTRAVENOUS ONCE
Status: COMPLETED | OUTPATIENT
Start: 2020-02-07 | End: 2020-02-07

## 2020-02-07 RX ORDER — SODIUM CHLORIDE 0.9 % (FLUSH) 0.9 %
10 SYRINGE (ML) INJECTION EVERY 12 HOURS SCHEDULED
Status: DISCONTINUED | OUTPATIENT
Start: 2020-02-07 | End: 2020-02-07 | Stop reason: HOSPADM

## 2020-02-07 RX ORDER — ACETAMINOPHEN 500 MG
1000 TABLET ORAL ONCE
Status: COMPLETED | OUTPATIENT
Start: 2020-02-07 | End: 2020-02-07

## 2020-02-07 RX ORDER — SODIUM CHLORIDE 0.9 % (FLUSH) 0.9 %
10 SYRINGE (ML) INJECTION AS NEEDED
Status: DISCONTINUED | OUTPATIENT
Start: 2020-02-07 | End: 2020-02-07 | Stop reason: HOSPADM

## 2020-02-07 RX ADMIN — SODIUM CHLORIDE, POTASSIUM CHLORIDE, SODIUM LACTATE AND CALCIUM CHLORIDE 1000 ML: 600; 310; 30; 20 INJECTION, SOLUTION INTRAVENOUS at 01:54

## 2020-02-07 RX ADMIN — ACETAMINOPHEN 1000 MG: 500 TABLET, FILM COATED ORAL at 00:37

## 2020-02-07 NOTE — DISCHARGE INSTR - LAB
Please keep next office visit. Return to labor and delivery if contractions return in frequency/intensity, vaginal bleeding (brown is normal after vaginal exam), loss of fluid, decreased fetal movement, or any other concerns for self or baby. Pelvic rest until cleared at next office visit. Increase water intake.

## 2020-02-07 NOTE — NON STRESS TEST
Margaret Meredith, a  at 33w0d with an [place] of 3/27/2020, by Ultrasound, was seen at [facility] for a nonstress test.    Chief Complaint   Patient presents with   • Fall     about two hours ago down 3 steps and landed on butt. caught herself with her left hand and is having some pain with movement. lower abdominal pain noted that worsens with movement. denies vaginal and loss of fluid. +FM       Patient Active Problem List   Diagnosis   • Normal pregnancy   • AMA (advanced maternal age) multigravida 35+   • Smoker   • Abnormal Pap smear of cervix   • Mild tetrahydrocannabinol (THC) abuse   • Pregnancy       Start Time: 0000  Stop Time: 311    Interpretation A  Nonstress Test Interpretation A: Reactive (20 : Yemi Sifuentes RN)

## 2020-02-10 ENCOUNTER — TELEPHONE (OUTPATIENT)
Dept: OBSTETRICS AND GYNECOLOGY | Facility: CLINIC | Age: 40
End: 2020-02-10

## 2020-02-10 NOTE — TELEPHONE ENCOUNTER
Please call patient and see if she is having any symptoms (pain, decreased fetal movement).  If so, please schedule her to be seen in the next 1-2 days or if urgent go to L&D.  If she is asymptomatic I am okay with her waiting to Thursday if she is okay. Alternatively, she could move up the US if there are spots and keep her appt with me on Thursday if asymptomatic. Thanks!

## 2020-02-10 NOTE — TELEPHONE ENCOUNTER
33-34 weeks pregnant. Last Thursday she fell down a few steps. Went to [facility] to be evaluated and was told baby is OK. Patient has appt this Thursday with you and an ultrasound. [facility] said she should try and get an earlier appt. There are no earlier appts at any office this week. Do want her worked in or is Thursday OK? Pt Ph [phone]

## 2020-02-10 NOTE — TELEPHONE ENCOUNTER
Pt stated she is doing fine. Denies pain or decreased fetal movement. Pt did reports white vaginal discharge. Pt will keep appt for Thursday, ultrasound appt was moved to tomorrow 2/11/20 at 11:40am

## 2020-02-11 ENCOUNTER — PROCEDURE VISIT (OUTPATIENT)
Dept: OBSTETRICS AND GYNECOLOGY | Facility: CLINIC | Age: 40
End: 2020-02-11

## 2020-02-11 DIAGNOSIS — O99.210 OBESITY IN PREGNANCY, ANTEPARTUM: ICD-10-CM

## 2020-02-11 DIAGNOSIS — Z36.89 ENCOUNTER FOR ULTRASOUND TO CHECK FETAL GROWTH: Primary | ICD-10-CM

## 2020-02-11 DIAGNOSIS — O09.523 MULTIGRAVIDA OF ADVANCED MATERNAL AGE IN THIRD TRIMESTER: ICD-10-CM

## 2020-02-11 PROCEDURE — 76819 FETAL BIOPHYS PROFIL W/O NST: CPT | Performed by: OBSTETRICS & GYNECOLOGY

## 2020-02-11 PROCEDURE — 76816 OB US FOLLOW-UP PER FETUS: CPT | Performed by: OBSTETRICS & GYNECOLOGY

## 2020-02-26 ENCOUNTER — ROUTINE PRENATAL (OUTPATIENT)
Dept: OBSTETRICS AND GYNECOLOGY | Facility: CLINIC | Age: 40
End: 2020-02-26

## 2020-02-26 VITALS — SYSTOLIC BLOOD PRESSURE: 144 MMHG | WEIGHT: 187 LBS | BODY MASS INDEX: 32.1 KG/M2 | DIASTOLIC BLOOD PRESSURE: 95 MMHG

## 2020-02-26 DIAGNOSIS — Z34.80 SUPERVISION OF OTHER NORMAL PREGNANCY, ANTEPARTUM: Primary | ICD-10-CM

## 2020-02-26 DIAGNOSIS — O16.3 ELEVATED BLOOD PRESSURE AFFECTING PREGNANCY IN THIRD TRIMESTER, ANTEPARTUM: ICD-10-CM

## 2020-02-26 PROCEDURE — 0502F SUBSEQUENT PRENATAL CARE: CPT | Performed by: OBSTETRICS & GYNECOLOGY

## 2020-02-26 NOTE — PROGRESS NOTES
"Chief Complaint   Patient presents with   • Routine Prenatal Visit     pt reports swollen feet and hands.       Margaret Meredith is a 39 y.o.  at 35w5d   Denies headache/vision changes/RUQ pain.  Thinks her  is stressing her out. He is not traveling for work anymore and that is making her stressed.  She also reports he spends a lot of money on alcohol, \"because he is an alcoholic... I told him I'm not calling him when I go into labor.\"   She denies any verbal/physical/emotional/sexual abuse. She reports feeling safe, but he \"annoys me.\"    She reports she has not been seen because she set her alarm wrong the first time and wrote down the wrong date the second time.    Denies cramping, vaginal bleeding   Notes normal fetal movement    /95   Wt 84.8 kg (187 lb)   LMP 2019 (Exact Date)   BMI 32.10 kg/m²    Gen: NAD, well appearing  Abd: gravid, nontender  See OB Flowsheet    ASSESSMENT:   1. IUP at 35w5d   2. AMA  3. GHTN: noted at triage visit on  and also today.    4. H/o Factor V   PLAN:  Reviewed preeclampsia precautions  Repeat labs today.  Will collect 24 hour urine prior to next visit. Given supplies.   Declined Tdap.  Reviewed common symptoms of the third trimester.  Counseled on labor precautions and anticipated fetal movements.  Reviewed kick counts.  Patient is aware of the location of L&D.     Return in about 1 week (around 3/4/2020).  Orders Placed This Encounter   Procedures   • Strep Grp B CYNTHIA + Reflex - Swab, Vaginal/Rectum   • Comprehensive Metabolic Panel   • CBC (No Diff)   • Protein / Creatinine Ratio, Urine - Urine, Clean Catch   • Creatinine, Urine, 24 Hour - Urine, Clean Catch   • Protein, Urine, 24 Hour - Urine, Clean Catch                "

## 2020-02-27 LAB
ALBUMIN SERPL-MCNC: 3.2 G/DL (ref 3.8–4.8)
ALBUMIN/GLOB SERPL: 1.2 {RATIO} (ref 1.2–2.2)
ALP SERPL-CCNC: 172 IU/L (ref 39–117)
ALT SERPL-CCNC: 5 IU/L (ref 0–32)
AST SERPL-CCNC: 8 IU/L (ref 0–40)
BILIRUB SERPL-MCNC: <0.2 MG/DL (ref 0–1.2)
BUN SERPL-MCNC: 12 MG/DL (ref 6–20)
BUN/CREAT SERPL: 19 (ref 9–23)
CALCIUM SERPL-MCNC: 8.9 MG/DL (ref 8.7–10.2)
CHLORIDE SERPL-SCNC: 105 MMOL/L (ref 96–106)
CO2 SERPL-SCNC: 17 MMOL/L (ref 20–29)
CREAT SERPL-MCNC: 0.63 MG/DL (ref 0.57–1)
CREAT UR-MCNC: 79.3 MG/DL
ERYTHROCYTE [DISTWIDTH] IN BLOOD BY AUTOMATED COUNT: 15 % (ref 11.7–15.4)
GLOBULIN SER CALC-MCNC: 2.6 G/DL (ref 1.5–4.5)
GLUCOSE SERPL-MCNC: 71 MG/DL (ref 65–99)
HCT VFR BLD AUTO: 30.2 % (ref 34–46.6)
HGB BLD-MCNC: 9.9 G/DL (ref 11.1–15.9)
MCH RBC QN AUTO: 26.8 PG (ref 26.6–33)
MCHC RBC AUTO-ENTMCNC: 32.8 G/DL (ref 31.5–35.7)
MCV RBC AUTO: 82 FL (ref 79–97)
PLATELET # BLD AUTO: 225 X10E3/UL (ref 150–450)
POTASSIUM SERPL-SCNC: 4.8 MMOL/L (ref 3.5–5.2)
PROT SERPL-MCNC: 5.8 G/DL (ref 6–8.5)
PROT UR-MCNC: 15.4 MG/DL
PROT/CREAT UR: 194 MG/G CREAT (ref 0–200)
RBC # BLD AUTO: 3.69 X10E6/UL (ref 3.77–5.28)
SODIUM SERPL-SCNC: 136 MMOL/L (ref 134–144)
WBC # BLD AUTO: 11.6 X10E3/UL (ref 3.4–10.8)

## 2020-02-28 LAB — GP B STREP DNA SPEC QL NAA+PROBE: NEGATIVE

## 2020-03-02 ENCOUNTER — TELEPHONE (OUTPATIENT)
Dept: OBSTETRICS AND GYNECOLOGY | Facility: CLINIC | Age: 40
End: 2020-03-02

## 2020-03-02 NOTE — TELEPHONE ENCOUNTER
----- Message from Shelley Mireles MD sent at 2/27/2020  9:07 AM EST -----  Please call patient and let her know that her blood work test results were normal; awaiting urine results.  If she has any questions, I am happy to answer them. Thanks!    03/02/20  Called patient to inform results, no answer. Left a message to return call.

## 2020-03-02 NOTE — TELEPHONE ENCOUNTER
----- Message from Shelley Mireles MD sent at 2/28/2020  1:21 PM EST -----  P:c also WNL    03/02/20  Called patient to inform results, no answer. Left a message to return call.

## 2020-03-03 LAB
PROT 24H UR-MRATE: 264 MG/24HOURS (ref 0–150)
PROT UR-MCNC: 22 MG/DL

## 2020-03-05 ENCOUNTER — ROUTINE PRENATAL (OUTPATIENT)
Dept: OBSTETRICS AND GYNECOLOGY | Facility: CLINIC | Age: 40
End: 2020-03-05

## 2020-03-05 ENCOUNTER — PROCEDURE VISIT (OUTPATIENT)
Dept: OBSTETRICS AND GYNECOLOGY | Facility: CLINIC | Age: 40
End: 2020-03-05

## 2020-03-05 VITALS — SYSTOLIC BLOOD PRESSURE: 136 MMHG | BODY MASS INDEX: 32.1 KG/M2 | DIASTOLIC BLOOD PRESSURE: 95 MMHG | WEIGHT: 187 LBS

## 2020-03-05 DIAGNOSIS — O09.523 MULTIGRAVIDA OF ADVANCED MATERNAL AGE IN THIRD TRIMESTER: ICD-10-CM

## 2020-03-05 DIAGNOSIS — O16.3 ELEVATED BLOOD PRESSURE AFFECTING PREGNANCY IN THIRD TRIMESTER, ANTEPARTUM: Primary | ICD-10-CM

## 2020-03-05 DIAGNOSIS — O99.210 OBESITY IN PREGNANCY, ANTEPARTUM: ICD-10-CM

## 2020-03-05 LAB
GLUCOSE UR STRIP-MCNC: NEGATIVE MG/DL
PROT UR STRIP-MCNC: ABNORMAL MG/DL

## 2020-03-05 PROCEDURE — 76819 FETAL BIOPHYS PROFIL W/O NST: CPT | Performed by: OBSTETRICS & GYNECOLOGY

## 2020-03-05 PROCEDURE — 0502F SUBSEQUENT PRENATAL CARE: CPT | Performed by: OBSTETRICS & GYNECOLOGY

## 2020-03-05 RX ORDER — LIDOCAINE HYDROCHLORIDE 10 MG/ML
5 INJECTION, SOLUTION EPIDURAL; INFILTRATION; INTRACAUDAL; PERINEURAL AS NEEDED
Status: CANCELLED | OUTPATIENT
Start: 2020-03-05

## 2020-03-05 RX ORDER — OXYTOCIN 10 [USP'U]/ML
250 INJECTION, SOLUTION INTRAMUSCULAR; INTRAVENOUS CONTINUOUS
Status: CANCELLED | OUTPATIENT
Start: 2020-03-05 | End: 2020-03-05

## 2020-03-05 RX ORDER — SODIUM CHLORIDE 0.9 % (FLUSH) 0.9 %
3 SYRINGE (ML) INJECTION EVERY 12 HOURS SCHEDULED
Status: CANCELLED | OUTPATIENT
Start: 2020-03-05

## 2020-03-05 RX ORDER — SODIUM CHLORIDE 0.9 % (FLUSH) 0.9 %
1-10 SYRINGE (ML) INJECTION AS NEEDED
Status: CANCELLED | OUTPATIENT
Start: 2020-03-05

## 2020-03-05 RX ORDER — OXYTOCIN 10 [USP'U]/ML
125 INJECTION, SOLUTION INTRAMUSCULAR; INTRAVENOUS CONTINUOUS PRN
Status: CANCELLED | OUTPATIENT
Start: 2020-03-05

## 2020-03-05 RX ORDER — CARBOPROST TROMETHAMINE 250 UG/ML
250 INJECTION, SOLUTION INTRAMUSCULAR AS NEEDED
Status: CANCELLED | OUTPATIENT
Start: 2020-03-05

## 2020-03-05 RX ORDER — MISOPROSTOL 100 UG/1
800 TABLET ORAL AS NEEDED
Status: CANCELLED | OUTPATIENT
Start: 2020-03-05

## 2020-03-05 RX ORDER — METHYLERGONOVINE MALEATE 0.2 MG/ML
200 INJECTION INTRAVENOUS ONCE AS NEEDED
Status: CANCELLED | OUTPATIENT
Start: 2020-03-05

## 2020-03-05 RX ORDER — OXYTOCIN 10 [USP'U]/ML
999 INJECTION, SOLUTION INTRAMUSCULAR; INTRAVENOUS ONCE
Status: CANCELLED | OUTPATIENT
Start: 2020-03-05

## 2020-03-05 RX ORDER — MISOPROSTOL 100 UG/1
25 TABLET ORAL
Status: CANCELLED | OUTPATIENT
Start: 2020-03-05 | End: 2020-03-05

## 2020-03-05 RX ORDER — OXYTOCIN 10 [USP'U]/ML
2-30 INJECTION, SOLUTION INTRAMUSCULAR; INTRAVENOUS
Status: CANCELLED | OUTPATIENT
Start: 2020-03-05

## 2020-03-05 NOTE — PROGRESS NOTES
Chief Complaint   Patient presents with   • Routine Prenatal Visit      Margaret Meredith is a 39 y.o.  at 36w6d   Denies headache/vision changes/RUQ pain  Denies cramping, vaginal bleeding   Notes normal fetal movement    /95   Wt 84.8 kg (187 lb)   LMP 2019 (Exact Date)   BMI 32.10 kg/m²    Gen: NAD, well appearing  Abd: gravid, nontender  See OB Flowsheet    ASSESSMENT:   1. IUP at 36w6d   2. Gestational hypertension  3. H/o Factor V Leiden, no h/o clot  4. AMA  PLAN:  Plan for IOL on  at 8 PM, instructions reviewed  Labs today  BPP today  Reviewed common symptoms of the third trimester.  Counseled on labor precautions and anticipated fetal movements.  Reviewed kick counts.  Patient is aware of the location of L&D.     No follow-ups on file.  Orders Placed This Encounter   Procedures   • POC Urinalysis Dipstick     This is an external result entered through the Results Console.

## 2020-03-07 LAB
CREAT UR-MCNC: 199.6 MG/DL
PROT UR-MCNC: 73 MG/DL
PROT/CREAT UR: 365.7 MG/G CREA (ref 0–200)

## 2020-03-08 ENCOUNTER — ANESTHESIA (OUTPATIENT)
Dept: LABOR AND DELIVERY | Facility: HOSPITAL | Age: 40
End: 2020-03-08

## 2020-03-08 ENCOUNTER — ANESTHESIA EVENT (OUTPATIENT)
Dept: LABOR AND DELIVERY | Facility: HOSPITAL | Age: 40
End: 2020-03-08

## 2020-03-08 ENCOUNTER — HOSPITAL ENCOUNTER (OUTPATIENT)
Dept: LABOR AND DELIVERY | Facility: HOSPITAL | Age: 40
Discharge: HOME | End: 2020-03-08

## 2020-03-08 ENCOUNTER — HOSPITAL ENCOUNTER (INPATIENT)
Facility: HOSPITAL | Age: 40
LOS: 3 days | Discharge: HOME | End: 2020-03-11
Attending: OBSTETRICS & GYNECOLOGY | Admitting: OBSTETRICS & GYNECOLOGY

## 2020-03-08 DIAGNOSIS — O16.3 ELEVATED BLOOD PRESSURE AFFECTING PREGNANCY IN THIRD TRIMESTER, ANTEPARTUM: ICD-10-CM

## 2020-03-08 PROBLEM — O13.9 GESTATIONAL HYPERTENSION: Status: ACTIVE | Noted: 2020-03-08

## 2020-03-08 LAB
ABO GROUP BLD: NORMAL
BLD GP AB SCN SERPL QL: NEGATIVE
DEPRECATED RDW RBC AUTO: 42.3 FL (ref 37–54)
ERYTHROCYTE [DISTWIDTH] IN BLOOD BY AUTOMATED COUNT: 14.5 % (ref 12.3–15.4)
HCT VFR BLD AUTO: 29.4 % (ref 34–46.6)
HGB BLD-MCNC: 9.5 G/DL (ref 12–15.9)
MCH RBC QN AUTO: 26.3 PG (ref 26.6–33)
MCHC RBC AUTO-ENTMCNC: 32.3 G/DL (ref 31.5–35.7)
MCV RBC AUTO: 81.4 FL (ref 79–97)
PLATELET # BLD AUTO: 231 10*3/MM3 (ref 140–450)
PMV BLD AUTO: 13.5 FL (ref 6–12)
RBC # BLD AUTO: 3.61 10*6/MM3 (ref 3.77–5.28)
RH BLD: POSITIVE
T&S EXPIRATION DATE: NORMAL
WBC NRBC COR # BLD: 10.52 10*3/MM3 (ref 3.4–10.8)

## 2020-03-08 PROCEDURE — 86901 BLOOD TYPING SEROLOGIC RH(D): CPT | Performed by: OBSTETRICS & GYNECOLOGY

## 2020-03-08 PROCEDURE — 3E0P7VZ INTRODUCTION OF HORMONE INTO FEMALE REPRODUCTIVE, VIA NATURAL OR ARTIFICIAL OPENING: ICD-10-PCS | Performed by: OBSTETRICS & GYNECOLOGY

## 2020-03-08 PROCEDURE — 85027 COMPLETE CBC AUTOMATED: CPT | Performed by: OBSTETRICS & GYNECOLOGY

## 2020-03-08 PROCEDURE — 86850 RBC ANTIBODY SCREEN: CPT | Performed by: OBSTETRICS & GYNECOLOGY

## 2020-03-08 PROCEDURE — 86900 BLOOD TYPING SEROLOGIC ABO: CPT | Performed by: OBSTETRICS & GYNECOLOGY

## 2020-03-08 RX ORDER — SODIUM CHLORIDE 0.9 % (FLUSH) 0.9 %
1-10 SYRINGE (ML) INJECTION AS NEEDED
Status: DISCONTINUED | OUTPATIENT
Start: 2020-03-08 | End: 2020-03-09 | Stop reason: HOSPADM

## 2020-03-08 RX ORDER — SODIUM CHLORIDE 0.9 % (FLUSH) 0.9 %
3 SYRINGE (ML) INJECTION EVERY 12 HOURS SCHEDULED
Status: DISCONTINUED | OUTPATIENT
Start: 2020-03-08 | End: 2020-03-09 | Stop reason: HOSPADM

## 2020-03-08 RX ORDER — DIPHENHYDRAMINE HYDROCHLORIDE 50 MG/ML
12.5 INJECTION INTRAMUSCULAR; INTRAVENOUS EVERY 8 HOURS PRN
Status: DISCONTINUED | OUTPATIENT
Start: 2020-03-08 | End: 2020-03-09 | Stop reason: HOSPADM

## 2020-03-08 RX ORDER — OXYTOCIN-SODIUM CHLORIDE 0.9% IV SOLN 30 UNIT/500ML 30-0.9/5 UT/ML-%
2-30 SOLUTION INTRAVENOUS
Status: DISCONTINUED | OUTPATIENT
Start: 2020-03-08 | End: 2020-03-09 | Stop reason: HOSPADM

## 2020-03-08 RX ORDER — LIDOCAINE HYDROCHLORIDE 10 MG/ML
5 INJECTION, SOLUTION EPIDURAL; INFILTRATION; INTRACAUDAL; PERINEURAL AS NEEDED
Status: DISCONTINUED | OUTPATIENT
Start: 2020-03-08 | End: 2020-03-09 | Stop reason: HOSPADM

## 2020-03-08 RX ORDER — ONDANSETRON 2 MG/ML
4 INJECTION INTRAMUSCULAR; INTRAVENOUS ONCE AS NEEDED
Status: DISCONTINUED | OUTPATIENT
Start: 2020-03-08 | End: 2020-03-09 | Stop reason: HOSPADM

## 2020-03-08 RX ORDER — FAMOTIDINE 10 MG/ML
20 INJECTION, SOLUTION INTRAVENOUS ONCE AS NEEDED
Status: COMPLETED | OUTPATIENT
Start: 2020-03-08 | End: 2020-03-09

## 2020-03-08 RX ORDER — MISOPROSTOL 100 MCG
25 TABLET ORAL
Status: COMPLETED | OUTPATIENT
Start: 2020-03-08 | End: 2020-03-09

## 2020-03-08 RX ORDER — EPHEDRINE SULFATE 50 MG/ML
5 INJECTION, SOLUTION INTRAVENOUS AS NEEDED
Status: DISCONTINUED | OUTPATIENT
Start: 2020-03-08 | End: 2020-03-09 | Stop reason: HOSPADM

## 2020-03-08 RX ADMIN — MISOPROSTOL 25 MCG: 100 TABLET ORAL at 22:27

## 2020-03-08 RX ADMIN — SODIUM CHLORIDE, POTASSIUM CHLORIDE, SODIUM LACTATE AND CALCIUM CHLORIDE 125 ML: 600; 310; 30; 20 INJECTION, SOLUTION INTRAVENOUS at 22:00

## 2020-03-08 RX ADMIN — SODIUM CHLORIDE, POTASSIUM CHLORIDE, SODIUM LACTATE AND CALCIUM CHLORIDE 125 ML/HR: 600; 310; 30; 20 INJECTION, SOLUTION INTRAVENOUS at 22:00

## 2020-03-09 LAB
ALBUMIN SERPL-MCNC: 3.1 G/DL (ref 3.5–5.2)
ALBUMIN/GLOB SERPL: 1.1 G/DL
ALP SERPL-CCNC: 182 U/L (ref 39–117)
ALT SERPL W P-5'-P-CCNC: 5 U/L (ref 1–33)
ANION GAP SERPL CALCULATED.3IONS-SCNC: 12.3 MMOL/L (ref 5–15)
AST SERPL-CCNC: 8 U/L (ref 1–32)
ATMOSPHERIC PRESS: 758.3 MMHG
ATMOSPHERIC PRESS: 759.4 MMHG
BASE EXCESS BLDCOA CALC-SCNC: -8.3 MMOL/L
BASE EXCESS BLDCOV CALC-SCNC: -6.3 MMOL/L (ref -30–30)
BDY SITE: ABNORMAL
BDY SITE: ABNORMAL
BILIRUB SERPL-MCNC: <0.2 MG/DL (ref 0.2–1.2)
BUN BLD-MCNC: 9 MG/DL (ref 6–20)
BUN/CREAT SERPL: 13.2 (ref 7–25)
CALCIUM SPEC-SCNC: 8.4 MG/DL (ref 8.6–10.5)
CHLORIDE SERPL-SCNC: 103 MMOL/L (ref 98–107)
CO2 SERPL-SCNC: 18.7 MMOL/L (ref 22–29)
COLLECT TME SMN: ABNORMAL
CREAT BLD-MCNC: 0.68 MG/DL (ref 0.57–1)
GFR SERPL CREATININE-BSD FRML MDRD: 97 ML/MIN/1.73
GLOBULIN UR ELPH-MCNC: 2.9 GM/DL
GLUCOSE BLD-MCNC: 100 MG/DL (ref 65–99)
HCO3 BLDCOA-SCNC: 21.9 MMOL/L (ref 22–28)
HCO3 BLDCOV-SCNC: 20 MMOL/L
MODALITY: ABNORMAL
MODALITY: ABNORMAL
NOTE: ABNORMAL
NOTE: ABNORMAL
PCO2 BLDCOA: 62.1 MMHG
PCO2 BLDCOV: 41.7 MM HG (ref 35–51.3)
PH BLDCOA: 7.16 PH UNITS (ref 7.18–7.34)
PH BLDCOV: 7.29 PH UNITS (ref 7.26–7.4)
PO2 BLDCOA: 26.8 MMHG (ref 12–26)
PO2 BLDCOV: 30.3 MM HG (ref 19–39)
POTASSIUM BLD-SCNC: 4.1 MMOL/L (ref 3.5–5.2)
PROT SERPL-MCNC: 6 G/DL (ref 6–8.5)
SAO2 % BLDCOA: 33.7 % (ref 92–99)
SAO2 % BLDCOA: 50.9 % (ref 92–99)
SAO2 % BLDCOV: ABNORMAL %
SODIUM BLD-SCNC: 134 MMOL/L (ref 136–145)

## 2020-03-09 PROCEDURE — C1755 CATHETER, INTRASPINAL: HCPCS | Performed by: ANESTHESIOLOGY

## 2020-03-09 PROCEDURE — C1755 CATHETER, INTRASPINAL: HCPCS

## 2020-03-09 PROCEDURE — 80053 COMPREHEN METABOLIC PANEL: CPT | Performed by: OBSTETRICS & GYNECOLOGY

## 2020-03-09 PROCEDURE — 0KQM0ZZ REPAIR PERINEUM MUSCLE, OPEN APPROACH: ICD-10-PCS | Performed by: OBSTETRICS & GYNECOLOGY

## 2020-03-09 PROCEDURE — 25010000002 ROPIVACAINE PER 1 MG: Performed by: ANESTHESIOLOGY

## 2020-03-09 PROCEDURE — 59400 OBSTETRICAL CARE: CPT | Performed by: OBSTETRICS & GYNECOLOGY

## 2020-03-09 PROCEDURE — 25010000002 BUTORPHANOL PER 1 MG: Performed by: OBSTETRICS & GYNECOLOGY

## 2020-03-09 PROCEDURE — 3E033VJ INTRODUCTION OF OTHER HORMONE INTO PERIPHERAL VEIN, PERCUTANEOUS APPROACH: ICD-10-PCS | Performed by: OBSTETRICS & GYNECOLOGY

## 2020-03-09 PROCEDURE — 82803 BLOOD GASES ANY COMBINATION: CPT

## 2020-03-09 RX ORDER — DOCUSATE SODIUM 100 MG/1
100 CAPSULE, LIQUID FILLED ORAL 2 TIMES DAILY PRN
Status: DISCONTINUED | OUTPATIENT
Start: 2020-03-09 | End: 2020-03-11 | Stop reason: HOSPADM

## 2020-03-09 RX ORDER — ERYTHROMYCIN 5 MG/G
OINTMENT OPHTHALMIC
Status: DISPENSED
Start: 2020-03-09 | End: 2020-03-10

## 2020-03-09 RX ORDER — OXYTOCIN-SODIUM CHLORIDE 0.9% IV SOLN 30 UNIT/500ML 30-0.9/5 UT/ML-%
250 SOLUTION INTRAVENOUS CONTINUOUS
Status: ACTIVE | OUTPATIENT
Start: 2020-03-09 | End: 2020-03-09

## 2020-03-09 RX ORDER — HYDROCODONE BITARTRATE AND ACETAMINOPHEN 5; 325 MG/1; MG/1
1 TABLET ORAL EVERY 4 HOURS PRN
Status: DISCONTINUED | OUTPATIENT
Start: 2020-03-09 | End: 2020-03-09

## 2020-03-09 RX ORDER — ONDANSETRON 4 MG/1
4 TABLET, FILM COATED ORAL EVERY 8 HOURS PRN
Status: DISCONTINUED | OUTPATIENT
Start: 2020-03-09 | End: 2020-03-11 | Stop reason: HOSPADM

## 2020-03-09 RX ORDER — METHYLERGONOVINE MALEATE 0.2 MG/ML
200 INJECTION INTRAVENOUS ONCE AS NEEDED
Status: DISCONTINUED | OUTPATIENT
Start: 2020-03-09 | End: 2020-03-09 | Stop reason: HOSPADM

## 2020-03-09 RX ORDER — HYDROXYZINE 50 MG/1
50 TABLET, FILM COATED ORAL NIGHTLY PRN
Status: DISCONTINUED | OUTPATIENT
Start: 2020-03-09 | End: 2020-03-11 | Stop reason: HOSPADM

## 2020-03-09 RX ORDER — PRENATAL VIT NO.126/IRON/FOLIC 28MG-0.8MG
1 TABLET ORAL DAILY
Status: DISCONTINUED | OUTPATIENT
Start: 2020-03-09 | End: 2020-03-11 | Stop reason: HOSPADM

## 2020-03-09 RX ORDER — SODIUM CHLORIDE, SODIUM LACTATE, POTASSIUM CHLORIDE, CALCIUM CHLORIDE 600; 310; 30; 20 MG/100ML; MG/100ML; MG/100ML; MG/100ML
125 INJECTION, SOLUTION INTRAVENOUS CONTINUOUS
Status: DISCONTINUED | OUTPATIENT
Start: 2020-03-09 | End: 2020-03-09

## 2020-03-09 RX ORDER — OXYTOCIN-SODIUM CHLORIDE 0.9% IV SOLN 30 UNIT/500ML 30-0.9/5 UT/ML-%
125 SOLUTION INTRAVENOUS CONTINUOUS PRN
Status: COMPLETED | OUTPATIENT
Start: 2020-03-09 | End: 2020-03-09

## 2020-03-09 RX ORDER — OXYCODONE HYDROCHLORIDE AND ACETAMINOPHEN 5; 325 MG/1; MG/1
1 TABLET ORAL EVERY 4 HOURS PRN
Status: DISCONTINUED | OUTPATIENT
Start: 2020-03-09 | End: 2020-03-11 | Stop reason: HOSPADM

## 2020-03-09 RX ORDER — LIDOCAINE HYDROCHLORIDE AND EPINEPHRINE 15; 5 MG/ML; UG/ML
INJECTION, SOLUTION EPIDURAL AS NEEDED
Status: DISCONTINUED | OUTPATIENT
Start: 2020-03-09 | End: 2020-03-09 | Stop reason: SURG

## 2020-03-09 RX ORDER — MISOPROSTOL 200 UG/1
800 TABLET ORAL AS NEEDED
Status: DISCONTINUED | OUTPATIENT
Start: 2020-03-09 | End: 2020-03-09 | Stop reason: HOSPADM

## 2020-03-09 RX ORDER — BISACODYL 10 MG
10 SUPPOSITORY, RECTAL RECTAL DAILY PRN
Status: DISCONTINUED | OUTPATIENT
Start: 2020-03-10 | End: 2020-03-11 | Stop reason: HOSPADM

## 2020-03-09 RX ORDER — OXYCODONE HYDROCHLORIDE 5 MG/1
10 TABLET ORAL ONCE
Status: COMPLETED | OUTPATIENT
Start: 2020-03-09 | End: 2020-03-09

## 2020-03-09 RX ORDER — CALCIUM CARBONATE 200(500)MG
3 TABLET,CHEWABLE ORAL 3 TIMES DAILY PRN
Status: DISCONTINUED | OUTPATIENT
Start: 2020-03-09 | End: 2020-03-11 | Stop reason: HOSPADM

## 2020-03-09 RX ORDER — OXYTOCIN-SODIUM CHLORIDE 0.9% IV SOLN 30 UNIT/500ML 30-0.9/5 UT/ML-%
999 SOLUTION INTRAVENOUS ONCE
Status: COMPLETED | OUTPATIENT
Start: 2020-03-09 | End: 2020-03-09

## 2020-03-09 RX ORDER — CARBOPROST TROMETHAMINE 250 UG/ML
250 INJECTION, SOLUTION INTRAMUSCULAR AS NEEDED
Status: DISCONTINUED | OUTPATIENT
Start: 2020-03-09 | End: 2020-03-09 | Stop reason: HOSPADM

## 2020-03-09 RX ORDER — PHYTONADIONE 1 MG/.5ML
INJECTION, EMULSION INTRAMUSCULAR; INTRAVENOUS; SUBCUTANEOUS
Status: DISPENSED
Start: 2020-03-09 | End: 2020-03-10

## 2020-03-09 RX ORDER — SODIUM CHLORIDE 0.9 % (FLUSH) 0.9 %
1-10 SYRINGE (ML) INJECTION AS NEEDED
Status: DISCONTINUED | OUTPATIENT
Start: 2020-03-09 | End: 2020-03-11 | Stop reason: HOSPADM

## 2020-03-09 RX ORDER — BUTORPHANOL TARTRATE 1 MG/ML
1 INJECTION, SOLUTION INTRAMUSCULAR; INTRAVENOUS EVERY 4 HOURS PRN
Status: DISCONTINUED | OUTPATIENT
Start: 2020-03-09 | End: 2020-03-09

## 2020-03-09 RX ORDER — IBUPROFEN 600 MG/1
600 TABLET ORAL EVERY 8 HOURS PRN
Status: DISCONTINUED | OUTPATIENT
Start: 2020-03-09 | End: 2020-03-11 | Stop reason: HOSPADM

## 2020-03-09 RX ORDER — ROPIVACAINE HYDROCHLORIDE 2 MG/ML
INJECTION, SOLUTION EPIDURAL; INFILTRATION; PERINEURAL AS NEEDED
Status: DISCONTINUED | OUTPATIENT
Start: 2020-03-09 | End: 2020-03-09 | Stop reason: SURG

## 2020-03-09 RX ADMIN — SODIUM CHLORIDE, POTASSIUM CHLORIDE, SODIUM LACTATE AND CALCIUM CHLORIDE 999 ML/HR: 600; 310; 30; 20 INJECTION, SOLUTION INTRAVENOUS at 09:13

## 2020-03-09 RX ADMIN — OXYCODONE HYDROCHLORIDE 10 MG: 5 TABLET ORAL at 21:13

## 2020-03-09 RX ADMIN — Medication: at 20:01

## 2020-03-09 RX ADMIN — OXYTOCIN 250 ML/HR: 10 INJECTION, SOLUTION INTRAMUSCULAR; INTRAVENOUS at 14:26

## 2020-03-09 RX ADMIN — LIDOCAINE HYDROCHLORIDE AND EPINEPHRINE 3 ML: 15; 5 INJECTION, SOLUTION EPIDURAL at 09:21

## 2020-03-09 RX ADMIN — MISOPROSTOL 25 MCG: 100 TABLET ORAL at 02:22

## 2020-03-09 RX ADMIN — BUTORPHANOL TARTRATE 1 MG: 1 INJECTION, SOLUTION INTRAMUSCULAR; INTRAVENOUS at 04:38

## 2020-03-09 RX ADMIN — FAMOTIDINE 20 MG: 10 INJECTION INTRAVENOUS at 00:19

## 2020-03-09 RX ADMIN — Medication 10 ML/HR: at 09:28

## 2020-03-09 RX ADMIN — SODIUM CHLORIDE, POTASSIUM CHLORIDE, SODIUM LACTATE AND CALCIUM CHLORIDE 125 ML/HR: 600; 310; 30; 20 INJECTION, SOLUTION INTRAVENOUS at 00:45

## 2020-03-09 RX ADMIN — ROPIVACAINE HYDROCHLORIDE 5 ML: 2 INJECTION, SOLUTION EPIDURAL; INFILTRATION at 09:23

## 2020-03-09 RX ADMIN — ROPIVACAINE HYDROCHLORIDE 5 ML: 2 INJECTION, SOLUTION EPIDURAL; INFILTRATION at 09:25

## 2020-03-09 RX ADMIN — HYDROCODONE BITARTRATE AND ACETAMINOPHEN 1 TABLET: 5; 325 TABLET ORAL at 20:01

## 2020-03-09 RX ADMIN — OXYTOCIN 999 ML/HR: 10 INJECTION, SOLUTION INTRAMUSCULAR; INTRAVENOUS at 14:11

## 2020-03-09 RX ADMIN — IBUPROFEN 600 MG: 600 TABLET, FILM COATED ORAL at 16:22

## 2020-03-09 RX ADMIN — OXYTOCIN 125 ML/HR: 10 INJECTION, SOLUTION INTRAMUSCULAR; INTRAVENOUS at 15:38

## 2020-03-09 NOTE — H&P
[facility]  Obstetric History and Physical    Chief Complaint   Patient presents with   • Scheduled Induction     Scheduled IOL for GHTN. Pt reports positive FM, denies LOF or VB        Subjective     Patient is a 39 y.o. female  currently at 37w3d, who presents with plan for induction of labor given preeclampsia without severe features. Patient had new onset blood pressure elevations and elevated P:C.  She denies a headache/vision changes/RUQ pain. She denies any recent HSV outbreaks or prodromal symptoms (her last outbreak was years ago).      This pregnancy has been complicated by AMA, anterior fibroid, Factor V Leiden heterozygote (no h/o VTE).  Her previous obstetric/gynecological history is notable for one prior       Prenatal Information:  Prenatal Results     POC Urine Glucose/Protein     Test Value Reference Range Date Time    Urine Glucose Negative  Negative, 1000 mg/dL (3+) 20     Urine Protein Trace  Negative 20           Initial Prenatal Labs     Test Value Reference Range Date Time    Hemoglobin 12.1 g/dL 11.1 - 15.9 19 1559      12.0 g/dL 12.0 - 15.9 19 0011    Hematocrit 36.7 % 34.0 - 46.6 19 1559      36.6 % 34.0 - 46.6 19 0011    Platelets 231 10*3/mm3 140 - 450 20 2159      225 x10E3/uL 150 - 450 20 1637      248 10*3/mm3 140 - 450 20 0152      261 10*3/mm3 140 - 450 20 1301      334 x10E3/uL 150 - 450 19 1559      307 10*3/mm3 140 - 450 19 0011    Rubella IgG 10.60 index Immune >0.99 19 155    Hepatitis B SAg Negative  Negative 19 155    Hepatitis C Ab <0.1 s/co ratio 0.0 - 0.9 19 155    RPR Non Reactive  Non Reactive 19 1559    ABO A   20    Rh Positive   20    Antibody Screen Negative  Negative 19 155    HIV Non Reactive  Non Reactive 19 155    Urine Culture Final report   19 1628    Gonorrhea Negative  Negative 19 0835    Chlamydia  Negative  Negative 09/04/19 0835    TSH              2nd and 3rd Trimester     Test Value Reference Range Date Time    Hemoglobin (repeated) 9.5 g/dL 12.0 - 15.9 03/08/20 2159      9.9 g/dL 11.1 - 15.9 02/26/20 1637      9.2 g/dL 12.0 - 15.9 02/07/20 0152      9.4 g/dL 12.0 - 15.9 01/14/20 1301    Hematocrit (repeated) 29.4 % 34.0 - 46.6 03/08/20 2159      30.2 % 34.0 - 46.6 02/26/20 1637      28.2 % 34.0 - 46.6 02/07/20 0152      28.4 % 34.0 - 46.6 01/14/20 1301     mg/dL 65 - 179 01/14/20 1301    Antibody Screen (repeated) Negative   03/08/20 2159    GTT Fasting        GTT 1 Hr        GTT 2 Hr        GTT 3 Hr        Group B Strep Negative  Negative 02/26/20 0442          Drug Screening     Test Value Reference Range Date Time    Amphetamine Screen Negative ng/mL Xeyfmz=5822 09/03/19 1628    Barbiturate Screen Negative ng/mL Cghifs=337 09/03/19 1628    Benzodiazepine Screen Negative ng/mL Msxkiv=037 09/03/19 1628    Methadone Screen Negative ng/mL Rebhlb=883 09/03/19 1628    Phencyclidine Screen Negative ng/mL Cutoff=25 09/03/19 1628    Opiates Screen Negative ng/mL Yclvdt=003 09/03/19 1628    THC Screen Negative ng/mL Cutoff=50 09/03/19 1628    Cocaine Screen Negative ng/mL Uuwlmb=017 09/03/19 1628    Propoxyphene Screen Negative ng/mL Xjeobc=623 09/03/19 1628    Buprenorphine Screen        Methamphetamine Screen        Oxycodone Screen        Tricyclic Antidepressants Screen              Other (Risk screening)     Test Value Reference Range Date Time    Varicella IgG        Parvovirus IgG        CMV IgG        Cystic Fibrosis        Hemoglobin electrophoresis        NIPT        MSAFP-4        AFP (for NTD only)                  External Prenatal Results     Pregnancy Outside Results - Transcribed From Office Records - See Scanned Records For Details     Test Value Date Time    Hgb 9.5 g/dL 03/08/20 2159      9.9 g/dL 02/26/20 1637      9.2 g/dL 02/07/20 0152      9.4 g/dL 01/14/20 1301      12.1 g/dL  19 1559      12.0 g/dL 19 0011    Hct 29.4 % 20 2159      30.2 % 20 1637      28.2 % 20 0152      28.4 % 20 1301      36.7 % 19 1559      36.6 % 19 0011    ABO A  20 2159    Rh Positive  20 2159    Antibody Screen Negative  20 2159      Negative  19 1559    Glucose Fasting GTT       Glucose Tolerance Test 1 hour       Glucose Tolerance Test 3 hour       Gonorrhea (discrete) Negative  19 0835    Chlamydia (discrete) Negative  19 0835    RPR Non Reactive  19 1559    VDRL       Syphilis Antibody       Rubella 10.60 index 19 1559    HBsAg Negative  19 1559    Herpes Simplex Virus PCR       Herpes Simplex VIrus Culture       HIV Non Reactive  19 1559    Hep C RNA Quant PCR       Hep C Antibody <0.1 s/co ratio 19 1559    AFP       Group B Strep Negative  20 0442    GBS Susceptibility to Clindamycin       GBS Susceptibility to Erythromycin       Fetal Fibronectin       Genetic Testing, Maternal Blood             Drug Screening     Test Value Date Time    Urine Drug Screen       Amphetamine Screen Negative ng/mL 19 1628    Barbiturate Screen Negative ng/mL 19 1628    Benzodiazepine Screen Negative ng/mL 19 1628    Methadone Screen Negative ng/mL 19 1628    Phencyclidine Screen Negative ng/mL 19 1628    Opiates Screen       THC Screen       Cocaine Screen       Propoxyphene Screen Negative ng/mL 19 1628    Buprenorphine Screen       Methamphetamine Screen       Oxycodone Screen       Tricyclic Antidepressants Screen                    Past OB History:     OB History    Para Term  AB Living   4 1 1 0 2 1   SAB TAB Ectopic Molar Multiple Live Births   1 1 0 0 0 1      # Outcome Date GA Lbr Severino/2nd Weight Sex Delivery Anes PTL Lv   4 Current            3 Term 06 42w0d  3033 g (6 lb 11 oz) F Vag-Spont   SAKSHI   2 SAB      SAB      1 TAB      TAB           Past Medical History: Past Medical History:   Diagnosis Date   • Abnormal Pap smear of cervix 2018   • Factor V Leiden carrier (CMS/HCC)    • Herpes    • HPV (human papilloma virus) infection       Past Surgical History Past Surgical History:   Procedure Laterality Date   • TONSILLECTOMY     • WISDOM TOOTH EXTRACTION        Family History: Family History   Problem Relation Age of Onset   • Breast cancer Mother 35   • Diabetes Maternal Grandmother    • Ovarian cancer Neg Hx    • Uterine cancer Neg Hx    • Colon cancer Neg Hx    • Thyroid disease Neg Hx    • Deep vein thrombosis Neg Hx    • Pulmonary embolism Neg Hx       Social History:  reports that she has quit smoking. She started smoking about 23 months ago. She has never used smokeless tobacco.   reports that she does not drink alcohol.   reports that she does not use drugs.        General ROS: Pertinent items are noted in HPI    Objective       Vital Signs Range for the last 24 hours  Temperature: Temp:  [98 °F (36.7 °C)-99.1 °F (37.3 °C)] 98 °F (36.7 °C)   Temp Source: Temp src: Oral   BP: BP: (113-156)/() 119/70   Pulse: Heart Rate:  [] 91   Respirations: Resp:  [16] 16   SPO2: SpO2:  [98 %] 98 %   O2 Amount (l/min):     O2 Devices Device (Oxygen Therapy): room air   Weight: Weight:  [84.8 kg (187 lb)] 84.8 kg (187 lb)     Physical Examination: General appearance - alert, well appearing, and in no distress  Mental status - alert, oriented to person, place, and time  Eyes - sclera anicteric, left eye normal, right eye normal  Chest - no tachypnea, retractions or cyanosis  Heart - normal rate and regular rhythm  Abdomen - soft, nontender, gravid  Pelvic - normal external genitalia, normal vagina, cervix normal, no HSV lesions seen on external or internal genitalia with sterile speculum exam  Neurological -exam limited by epidural  Musculoskeletal - no joint tenderness, deformity or swelling  Extremities - pedal edema 2 +  Skin - normal  coloration and turgor, no rashes, no suspicious skin lesions noted    Presentation: Vertex   Cervix: Exam by: Method: sterile exam per RN   Dilation: Cervical Dilation (cm): 1   Effacement: Cervical Effacement: 60%   Station: Fetal Station: 1--> -2       Fetal Heart Rate Assessment   Method: Fetal HR Assessment Method: external   Beats/min: Fetal HR (beats/min): 130   Baseline: Fetal Heart Baseline Rate: normal range   Varibility: Fetal HR Variability: moderate (amplitude range 6 to 25 bpm)   Accels: Fetal HR Accelerations: greater than/equal to 15 bpm, lasting at least 15 seconds   Decels: Fetal HR Decelerations: absent   Tracing Category:       Uterine Assessment   Method: Method: palpation, external tocotransducer   Frequency (min): Contraction Frequency (Minutes): 1.5-4   Ctx Count in 10 min: Contractions in 10 Minutes: 3   Duration:     Intensity: Contraction Intensity: mild by palpation   Intensity by IUPC:     Resting Tone: Uterine Resting Tone: soft by palpation   Resting Tone by IUPC:     [place] Units:       Lab Results   Component Value Date    WBC 10.52 03/08/2020    HGB 9.5 (L) 03/08/2020    HCT 29.4 (L) 03/08/2020    MCV 81.4 03/08/2020     03/08/2020      US: 2/11/2020 EFW 2256g    Assessment/Plan   Assessment:  1.  Intrauterine pregnancy at 37w3d weeks gestation with reactive fetal status.    2.  Preeclampsia without severe features  3.  GBS status: Negative  4. HSV: no active outbreaks, no lesions, no prodromal symptoms  Plan:  1. IOL:   Cook catheter placed this check.  Cervix 1/70/-2. Patient tolerated well and is comfortable with epidural  2. Preeclampsia without severe features:   BP normal to mild range, asymptomatic.   Will send CMP in addition to CBC   Continue to monitor  3. Factor V Leiden:    SCDs while immobilized  4. HSV: reviewed with patient. She has no active outbreaks, no recent outbreaks, no evidence of lesions on exam, and no prodromal symptoms. Plan to proceed with IOL.      Plan of care has been reviewed with patient and family.  Risks, benefits of treatment plan have been discussed.  All questions have been answered.      Shelley Mireles MD  3/9/2020  10:08 AM

## 2020-03-09 NOTE — PLAN OF CARE
Problem: Patient Care Overview  Goal: Plan of Care Review  Outcome: Ongoing (interventions implemented as appropriate)  Flowsheets (Taken 3/9/2020 1608)  Progress: improving  Plan of Care Reviewed With: patient;significant other  Outcome Summary: Pt. resting in recovery after vaginal delivery. Will transport to mother baby.     Problem: Patient Care Overview  Goal: Individualization and Mutuality  Outcome: Ongoing (interventions implemented as appropriate)  Flowsheets (Taken 3/9/2020 0550 by Abena Lam RN)  Patient Specific Goals (Include Timeframe): Healthy mom and baby at time of delivery  How to Address Anxieties/Fears: educate and explain all care and intervention  Patient Specific Interventions: OMID, breastfeeding, epidural for pain control  What Information Would Help Us Give You More Personalized Care?: 13 daughter at home; delivered without epidural  How Would You and/or Your Support Person Like to Participate in Your Care?: fob at bedside for delivery  What Anxieties, Fears, Concerns, or Questions Do You Have About Your Care?: nervous about epidural and labor  Patient Specific Preferences: OMID, breastfeeding, epidural for pain control     Problem: Patient Care Overview  Goal: Discharge Needs Assessment  Outcome: Ongoing (interventions implemented as appropriate)  Flowsheets  Taken 3/8/2020 2142 by Beulah Ceja, RN  Equipment Needed After Discharge: none  Anticipated Changes Related to Illness: none  Transportation Concerns: car, none  Patient/Family Anticipated Services at Transition: none  Patient/Family Anticipates Transition to: home;home with family  Taken 3/8/2020 2145 by Beulah Ceja, RN  Equipment Currently Used at Home: none  Taken 3/9/2020 1608 by Letty Chua RN  Concerns to be Addressed: no discharge needs identified  Readmission Within the Last 30 Days: no previous admission in last 30 days     Problem: Patient Care Overview  Goal: Interprofessional Rounds/Family Conf  Outcome: Ongoing  (interventions implemented as appropriate)     Problem: Fall Risk,  (Adult,Obstetrics,Pediatric)  Goal: Identify Related Risk Factors and Signs and Symptoms  Outcome: Ongoing (interventions implemented as appropriate)  Flowsheets (Taken 3/9/2020 1608)  Related Risk Factors (Fall Risk, ): fatigue;medication side effects     Problem: Fall Risk,  (Adult,Obstetrics,Pediatric)  Goal: Absence of Maternal Fall  Outcome: Ongoing (interventions implemented as appropriate)  Flowsheets (Taken 3/9/2020 1608)  Absence of Maternal Fall: making progress toward outcome     Problem: Fall Risk,  (Adult,Obstetrics,Pediatric)  Goal: Absence of [place] Fall/Drop  Outcome: Ongoing (interventions implemented as appropriate)  Flowsheets (Taken 3/9/2020 1608)  Absence of  Fall/Drop: making progress toward outcome     Problem: Skin Injury Risk (Adult)  Goal: Identify Related Risk Factors and Signs and Symptoms  Outcome: Ongoing (interventions implemented as appropriate)  Flowsheets (Taken 3/9/2020 1608)  Related Risk Factors (Skin Injury Risk): medication;mobility impaired;moisture     Problem: Skin Injury Risk (Adult)  Goal: Skin Health and Integrity  Outcome: Ongoing (interventions implemented as appropriate)  Flowsheets (Taken 3/9/2020 1608)  Skin Health and Integrity: making progress toward outcome     Problem: Postpartum (Vaginal Delivery) (Adult,Obstetrics,Pediatric)  Goal: Signs and Symptoms of Listed Potential Problems Will be Absent, Minimized or Managed (Postpartum)  Outcome: Ongoing (interventions implemented as appropriate)  Flowsheets (Taken 3/9/2020 1615)  Problems Assessed (Postpartum Vaginal Delivery): all  Problems Present (Postpartum Vag Deliv): none     Problem: Anesthesia/Analgesia, Neuraxial (Obstetrics)  Goal: Signs and Symptoms of Listed Potential Problems Will be Absent, Minimized or Managed (Anesthesia/Analgesia, Neuraxial)  Outcome: Ongoing (interventions implemented as  appropriate)  Flowsheets (Taken 3/9/2020 1616)  Problems Assessed (Neuraxial Anesthesia/Analgesia, OB): all  Problems Present (Neuraxial Anesth OB): none     Problem: Labor (Cervical Ripen, Induct, Augment) (Adult,Obstetrics,Pediatric)  Goal: Signs and Symptoms of Listed Potential Problems Will be Absent, Minimized or Managed (Labor)  Outcome: Outcome(s) achieved  Flowsheets (Taken 3/9/2020 1608)  Problems Assessed (Labor): all  Problems Present (Labor): none

## 2020-03-09 NOTE — ANESTHESIA PROCEDURE NOTES
Labor Epidural      Patient reassessed immediately prior to procedure    Patient location during procedure: OB  Start Time: 3/9/2020 9:10 AM  Stop Time: 3/9/2020 9:21 AM  Performed By  Anesthesiologist: Festus Carrillo MD  Preanesthetic Checklist  Completed: patient identified, site marked, surgical consent, pre-op evaluation, timeout performed, IV checked, risks and benefits discussed and monitors and equipment checked  Additional Notes  The epidural catheter was connected to the PCEA and detailed instructions were given to the patient for usage of the PCEA pump.  Sterile technique was observed throughout the entire procedure. Atraumatic, easy.  Prep:  Pt Position:sitting  Sterile Tech:cap, gloves, mask and sterile barrier  Prep:chlorhexidine gluconate and isopropyl alcohol  Monitoring:blood pressure monitoring, continuous pulse oximetry and EKG  Epidural Block Procedure:  Approach:midline  Guidance:landmark technique and palpation technique  Location:L4-L5  Needle Type:Tuohy  Needle Gauge:17 G  Loss of Resistance Medium: saline  Loss of Resistance: 6cm  Cath Depth at skin:11 cm  Paresthesia: none  Aspiration:negative  Test Dose:negative  Number of Attempts: 1  Post Assessment:  Dressing:occlusive dressing applied and secured with tape  Pt Tolerance:patient tolerated the procedure well with no apparent complications  Complications:no

## 2020-03-09 NOTE — L&D DELIVERY NOTE
[facility]  Vaginal Delivery Note    Delivery     Delivery: Vaginal, Spontaneous     YOB: 2019    Time of Birth:  Gestational Age 2:09 PM   37w3d     Anesthesia:       Delivering clinician:      Forceps?   No   Vacuum? No    Shoulder dystocia present: No        Delivery narrative:  Preop diagnosis:  39 y.o.  year old  at 37w3d      Preeclampsia without severe features  Postop diagnosis: Same    Indications: Margaret Meredith is a 39 y.o.  who presented at 37w3d with plan for induction of labor secondary to preeclampsia without severe features.  She was induced with misoprostol, pitocin and Cook catheter.  After the Cook catheter came out she progressed rapidly to complete dilation    Findings: Female infant, Apgar 9/9, Weight pending; second degree lacerations noted and repaired;     Procedure:The cervix was noted to be completely dilated, completely effaced, and +3 station. Under continuous electronic fetal heart rate monitoring, the patient was encouraged to push. With good maternal effort she delivered a viable, vigorous female infant. The head presented in the OA presentation and restituted to maternal left. There was no nuchal cord present. The left anterior fetal shoulder was then easily delivered rapidly following the head and the posterior fetal shoulder, followed by the remainder of the infant without difficulty. The infant was immediately vigorous. The cord was clamped 30 seconds following delivery. The cord was cut and the infant was placed to maternal abdomen. Cord blood and gases were then collected. The placenta then delivered spontaneously intact, and a three vessel cord was noted. Uterine massage and IV Pitocin were given until the fundus was firm. The cervix, vagina, perineum, and rectum were carefully inspected for lacerations and a second degree was noted. This was repaired in standard fashion with 3-0 vicryl. Counts for needles, sponges, laps and instruments were  correct times two at the end of the delivery. There were no sponges left in the vagina. There were no major complications. Mother and baby were bonding well at the end of the delivery.              Infant    Findings: female  infant     Infant observations: Weight: No birth weight on file.   Length:    in  Observations/Comments:  scale 4      Apgars: 9   @ 1 minute /    9   @ 5 minutes   Infant Name: Patricia Silvestre     Placenta, Cord, and Fluid    Placenta delivered  Spontaneous  at   3/9/2020  2:11 PM     Cord:    present.   Nuchal Cord?  no   Cord blood obtained:      Cord gases obtained:       Cord gas results: Venous:  No results found for: PHCVEN    Arterial:  No results found for: PHCART     Repair    Episiotomy: Not recorded     No    Lacerations: Yes  Laceration Information  Laceration Repaired?   Perineal:         Periurethral:         Labial:         Sulcus:         Vaginal:         Cervical:           Suture used for repair: 3-0 Vicryl   Estimated Blood Loss:             Complications  Preeclampsia without severe features    Disposition  Mother to Mother Baby/Postpartum  in stable condition currently.  Baby to remains with mom  in stable condition currently.      Shelley Mireles MD  03/09/20  2:26 PM

## 2020-03-09 NOTE — PLAN OF CARE
Pt resting comfortably after dose of stadol. Cytotec given vaginally x2. Plan for pitocin. Continue toward vaginal delivery.

## 2020-03-09 NOTE — LACTATION NOTE
This note was copied from a baby's chart.  P2T. Mother reports she fed last child for 1 yr 2 mths, no issues during that time. Lactation rn discussed feeding infant every 2-3 hours and PRN, deep latch technique and benefits thereof, hand expression, feeding on both sides each feed minimum 10-15 min, expected ouput, and when to expect milk to come in.  Mother expressed understanding and had no further questions at this time. Advised to call for any assist needed. Has a personal pump at home.

## 2020-03-09 NOTE — ANESTHESIA PREPROCEDURE EVALUATION
Anesthesia Evaluation     Patient summary reviewed and Nursing notes reviewed                Airway   Mallampati: II  TM distance: >3 FB  Neck ROM: full  Dental - normal exam     Pulmonary - normal exam   (+) a smoker Former,   Cardiovascular - normal exam    (+) hypertension,       Neuro/Psych- negative ROS  GI/Hepatic/Renal/Endo    (+) obesity,       Musculoskeletal (-) negative ROS    Abdominal    Substance History   (+) drug use     OB/GYN    (+) Pregnant,         Other                        Anesthesia Plan    ASA 2     epidural   (37w2d)    Anesthetic plan, all risks, benefits, and alternatives have been provided, discussed and informed consent has been obtained with: patient.

## 2020-03-10 LAB
BASOPHILS # BLD AUTO: 0.03 10*3/MM3 (ref 0–0.2)
BASOPHILS NFR BLD AUTO: 0.2 % (ref 0–1.5)
DEPRECATED RDW RBC AUTO: 41.4 FL (ref 37–54)
EOSINOPHIL # BLD AUTO: 0.34 10*3/MM3 (ref 0–0.4)
EOSINOPHIL NFR BLD AUTO: 2.7 % (ref 0.3–6.2)
ERYTHROCYTE [DISTWIDTH] IN BLOOD BY AUTOMATED COUNT: 14.4 % (ref 12.3–15.4)
HCT VFR BLD AUTO: 23.8 % (ref 34–46.6)
HGB BLD-MCNC: 7.8 G/DL (ref 12–15.9)
LYMPHOCYTES # BLD AUTO: 1.84 10*3/MM3 (ref 0.7–3.1)
LYMPHOCYTES NFR BLD AUTO: 14.4 % (ref 19.6–45.3)
MCH RBC QN AUTO: 26.6 PG (ref 26.6–33)
MCHC RBC AUTO-ENTMCNC: 32.8 G/DL (ref 31.5–35.7)
MCV RBC AUTO: 81.2 FL (ref 79–97)
MONOCYTES # BLD AUTO: 0.8 10*3/MM3 (ref 0.1–0.9)
MONOCYTES NFR BLD AUTO: 6.3 % (ref 5–12)
NEUTROPHILS # BLD AUTO: 9.66 10*3/MM3 (ref 1.7–7)
NEUTROPHILS NFR BLD AUTO: 75.9 % (ref 42.7–76)
PLATELET # BLD AUTO: 157 10*3/MM3 (ref 140–450)
PMV BLD AUTO: 12.9 FL (ref 6–12)
RBC # BLD AUTO: 2.93 10*6/MM3 (ref 3.77–5.28)
WBC NRBC COR # BLD: 12.74 10*3/MM3 (ref 3.4–10.8)

## 2020-03-10 PROCEDURE — 85025 COMPLETE CBC W/AUTO DIFF WBC: CPT | Performed by: OBSTETRICS & GYNECOLOGY

## 2020-03-10 RX ADMIN — ANTACID TABLETS 3 TABLET: 500 TABLET, CHEWABLE ORAL at 10:36

## 2020-03-10 RX ADMIN — OXYCODONE HYDROCHLORIDE AND ACETAMINOPHEN 1 TABLET: 5; 325 TABLET ORAL at 21:43

## 2020-03-10 RX ADMIN — IBUPROFEN 600 MG: 600 TABLET, FILM COATED ORAL at 08:25

## 2020-03-10 RX ADMIN — DOCUSATE SODIUM 100 MG: 100 CAPSULE, LIQUID FILLED ORAL at 08:25

## 2020-03-10 RX ADMIN — IBUPROFEN 600 MG: 600 TABLET, FILM COATED ORAL at 15:53

## 2020-03-10 RX ADMIN — IBUPROFEN 600 MG: 600 TABLET, FILM COATED ORAL at 00:45

## 2020-03-10 RX ADMIN — OXYCODONE HYDROCHLORIDE AND ACETAMINOPHEN 1 TABLET: 5; 325 TABLET ORAL at 01:38

## 2020-03-10 RX ADMIN — DOCUSATE SODIUM 100 MG: 100 CAPSULE, LIQUID FILLED ORAL at 01:38

## 2020-03-10 RX ADMIN — OXYCODONE HYDROCHLORIDE AND ACETAMINOPHEN 1 TABLET: 5; 325 TABLET ORAL at 11:42

## 2020-03-10 RX ADMIN — Medication 1 TABLET: at 08:25

## 2020-03-10 RX ADMIN — OXYCODONE HYDROCHLORIDE AND ACETAMINOPHEN 1 TABLET: 5; 325 TABLET ORAL at 15:53

## 2020-03-10 RX ADMIN — OXYCODONE HYDROCHLORIDE AND ACETAMINOPHEN 1 TABLET: 5; 325 TABLET ORAL at 06:38

## 2020-03-10 NOTE — PLAN OF CARE
Problem: Patient Care Overview  Goal: Plan of Care Review  Outcome: Ongoing (interventions implemented as appropriate)  Flowsheets  Taken 3/9/2020 1608 by Letty Chua RN  Progress: improving  Taken 3/9/2020 1805 by Jennifer Rivera RN  Plan of Care Reviewed With: patient  Note:   Doing well, pain now well controlled, working on bst feeding    Goal: Individualization and Mutuality  Outcome: Ongoing (interventions implemented as appropriate)  Goal: Discharge Needs Assessment  Outcome: Ongoing (interventions implemented as appropriate)  Goal: Interprofessional Rounds/Family Conf  Outcome: Ongoing (interventions implemented as appropriate)     Problem: Fall Risk,  (Adult,Obstetrics,Pediatric)  Goal: Identify Related Risk Factors and Signs and Symptoms  Outcome: Ongoing (interventions implemented as appropriate)  Goal: Absence of Maternal Fall  Outcome: Ongoing (interventions implemented as appropriate)  Goal: Absence of [place] Fall/Drop  Outcome: Ongoing (interventions implemented as appropriate)     Problem: Skin Injury Risk (Adult)  Goal: Identify Related Risk Factors and Signs and Symptoms  Outcome: Ongoing (interventions implemented as appropriate)  Goal: Skin Health and Integrity  Outcome: Ongoing (interventions implemented as appropriate)     Problem: Postpartum (Vaginal Delivery) (Adult,Obstetrics,Pediatric)  Goal: Signs and Symptoms of Listed Potential Problems Will be Absent, Minimized or Managed (Postpartum)  Outcome: Ongoing (interventions implemented as appropriate)     Problem: Anesthesia/Analgesia, Neuraxial (Obstetrics)  Goal: Signs and Symptoms of Listed Potential Problems Will be Absent, Minimized or Managed (Anesthesia/Analgesia, Neuraxial)  Outcome: Ongoing (interventions implemented as appropriate)     Problem: Breastfeeding (Pediatric,[place],NICU)  Goal: Identify Related Risk Factors and Signs and Symptoms  Outcome: Ongoing (interventions implemented as appropriate)  Goal: Effective  Breastfeeding  Outcome: Ongoing (interventions implemented as appropriate)

## 2020-03-10 NOTE — PROGRESS NOTES
Postpartum Progress Note      Status post Vaginal Delivery: Doing well postoperatively.     1) postpartum care immediately following delivery : Continue routine postpartum care.  2) Gestational hypertension--blood pressures in normal to mild range.  No indication for treatment.    Rh status: A pos  Rubella: Immune  Gender: female    Subjective     Postpartum Day 1: Vaginal delivery    The patient feels well. The patient denies emotional concerns. Pain is well controlled with current medications. The baby is well. The patient is ambulating well. The patient is tolerating a normal diet.     Objective     Vital signs in last 24 hours:  Temp:  [97.4 °F (36.3 °C)-98.2 °F (36.8 °C)] 97.4 °F (36.3 °C)  Heart Rate:  [] 83  Resp:  [16-18] 16  BP: (108-158)/() 116/71      General:    alert, appears stated age and cooperative   Abdomen:  Soft, Non-tender    Lochia:  appropriate   Uterine Fundus:   firm   Ext    Edema 1+   DVT Evaluation:  No evidence of DVT seen on physical exam.     Lab Results   Component Value Date    WBC 12.74 (H) 03/10/2020    HGB 7.8 (L) 03/10/2020    HCT 23.8 (L) 03/10/2020    MCV 81.2 03/10/2020     03/10/2020       Corin Rivera MD  3/10/2020  10:58

## 2020-03-10 NOTE — LACTATION NOTE
Lactation Consult Note  P2 37w3d baby. She has been nursing well so far per Mom. Mom nursed her first child, who is 14yo now, for 18 months. Assisted with cross-cradle position but baby girl is mostly sleepy now and suckles a few times. Discussed pumping and feeding all ebm to baby. She has her pump in the car. Encouraged to call for instructions depending on how well baby is nursing. Discussed ways to determine if baby is getting enough milk. Encouraged to call for next feeding.    Evaluation Completed: 3/10/2020 10:33  Patient Name: Margaret Meredith  :  1980  MRN:  [id number]     REFERRAL  INFORMATION:                          Date of Referral: 03/10/20   Person Making Referral: nurse  Maternal Reason for Referral: breastfeeding currently  Infant Reason for Referral: 35-37 weeks gestation    DELIVERY HISTORY:          Skin to skin initiation date/time: 3/9/2020  2:11 PM   Skin to skin end date/time:              MATERNAL ASSESSMENT:  Breast Size Issue: none (03/10/20 1015 : Elba Whalen RN)  Breast Shape: round (03/10/20 1015 : Elba Whalen RN)  Breast Density: soft (03/10/20 1015 : Elba Whalen RN)  Areola: dense (03/10/20 1015 : Elba Whalen RN)  Nipples: everted (03/10/20 1015 : Elba Whalen RN)                INFANT ASSESSMENT:  Information for the patient's :  Omid MeredithMaria Luisahilda [[id number]]   No past medical history on file.    Feeding Readiness Cues: quiet, rooting (03/10/20 1015 : Elba Whalen RN)   Feeding Method: breastfeeding (03/10/20 1015 : Elba Whalen RN)   Feeding Tolerance/Success: arousal required, refusal to suck (03/10/20 1015 : Elba Whalen RN)                   Feeding Interventions: latch assistance provided (03/10/20 1015 : Elba Whalen RN)       Additional Documentation: LATCH Score (Group) (03/10/20 1015 : Elba Whalen RN)           Breastfeeding: breastfeeding, left side only  (03/10/20 1015 : Elba Whalen RN)   Infant Positioning: cross-cradle (03/10/20 1015 : Elba Whalen RN)           Effective Latch During Feeding: no (03/10/20 1015 : Elba Whalen RN)   Suck/Swallow Coordination: absent (03/10/20 1015 : Elba Whalen RN)   Signs of Milk Transfer: infant jaw motion present (03/10/20 1015 : Elba Whalen RN)       Latch: 1-->repeated attempts, holds nipple in mouth, stimulate to suck (03/10/20 1015 : Elba Whalen RN)   Audible Swallowin-->none (03/10/20 1015 : Elba Whalen RN)   Type of Nipple: 2-->everted (after stimulation) (03/10/20 1015 : Elba Whalen RN)   Comfort (Breast/Nipple): 2-->soft/nontender (03/10/20 1015 : Elba Whalen RN)   Hold (Positioning): 0-->full assist (staff holds infant at breast) (03/10/20 1015 : Elba Whalen RN)   Latch Score: 5 (03/10/20 1015 : Elba Whalen RN)     Infant-Driven Feeding Scales - Readiness: Briefly alert with care. No hunger behaviors. No change in tone. (03/10/20 1015 : Elba Whalen RN)                 MATERNAL INFANT FEEDING:     Maternal Emotional State: assist needed (03/10/20 1015 : Elba Whalen RN)  Infant Positioning: cross-cradle (03/10/20 1015 : Elba Whalen RN)   Signs of Milk Transfer: infant jaw motion present (03/10/20 1015 : Elba Whalen RN)  Pain with Feeding: no (03/10/20 1015 : Elba Whalen RN)           Milk Ejection Reflex: present (03/10/20 1015 : Koby, Elba L, RN)           Latch Assistance: yes (03/10/20 1015 : Elba Whalen, YUSUF)                               EQUIPMENT TYPE:  Breast Pump Type: double electric, personal (03/10/20 1015 : Elba Whalen, RN)                              BREAST PUMPING:          LACTATION REFERRALS:

## 2020-03-11 VITALS
WEIGHT: 187 LBS | RESPIRATION RATE: 20 BRPM | HEIGHT: 64 IN | HEART RATE: 86 BPM | SYSTOLIC BLOOD PRESSURE: 139 MMHG | OXYGEN SATURATION: 98 % | DIASTOLIC BLOOD PRESSURE: 89 MMHG | TEMPERATURE: 98.6 F | BODY MASS INDEX: 31.92 KG/M2

## 2020-03-11 RX ORDER — OXYCODONE HYDROCHLORIDE AND ACETAMINOPHEN 5; 325 MG/1; MG/1
1 TABLET ORAL EVERY 4 HOURS PRN
Qty: 12 TABLET | Refills: 0 | Status: SHIPPED | OUTPATIENT
Start: 2020-03-11 | End: 2020-03-19

## 2020-03-11 RX ADMIN — HYDROCORTISONE 1 APPLICATION: 25 CREAM TOPICAL at 10:10

## 2020-03-11 RX ADMIN — DOCUSATE SODIUM 100 MG: 100 CAPSULE, LIQUID FILLED ORAL at 08:14

## 2020-03-11 RX ADMIN — OXYCODONE HYDROCHLORIDE AND ACETAMINOPHEN 1 TABLET: 5; 325 TABLET ORAL at 08:14

## 2020-03-11 RX ADMIN — IBUPROFEN 600 MG: 600 TABLET, FILM COATED ORAL at 05:38

## 2020-03-11 NOTE — LACTATION NOTE
"Patient has infant in [facility] and breasts are \"airing out\". C/O tender nipples but tissue is intact. Discussed importance of deep latch which patient feels baby is able to do. Reviewed nipple care and enc F/U in [facility]  Lactation Consult Note    Evaluation Completed: 3/11/2020 11:09  Patient Name: Margaret Meredith  :  1980  MRN:  [id number]     REFERRAL  INFORMATION:                          Date of Referral: 20   Person Making Referral: nurse  Maternal Reason for Referral: breastfeeding currently  Infant Reason for Referral: 35-37 weeks gestation    DELIVERY HISTORY:          Skin to skin initiation date/time: 3/9/2020  2:11 PM   Skin to skin end date/time:              MATERNAL ASSESSMENT:  Breast Size Issue: none (20 1105 : Yamel Jules RN)  Breast Shape: round (20 1105 : Yamel Jules, RN)  Breast Density: filling (20 1105 : Yamel Jules, RN)     Nipples: everted (20 1105 : Yamel Jules, RN)                INFANT ASSESSMENT:  Information for the patient's :  Olman Meredith [[id number]]   No past medical history on file.                                                                                                                                MATERNAL INFANT FEEDING:  Maternal Preparation: breast care, hand hygiene (20 1105 : Yamel Jules, RN)  Maternal Emotional State: relaxed, independent (20 1105 : Yamel Jules, RN)                                 Latch Assistance: no (20 1105 : Yamel Jules, RN)                               EQUIPMENT TYPE:  Breast Pump Type: double electric, personal (20 1105 : Yamel Jules, RN)                              BREAST PUMPING:          LACTATION REFERRALS:  Lactation Referrals: outpatient lactation program (20 1105 : Yamel Jules, RN)                                      "

## 2020-03-11 NOTE — PROGRESS NOTES
"Subjective:  Postpartum Day 2:     The patient feels well.  Pain is well controlled with current medications. The baby is well.  Urinary output is adequate. The patient is ambulating well. The patient is tolerating a normal diet. Flatus has been passed.      Objective:    Vital signs in last 24 hours:  Blood pressure 147/84, pulse 89, temperature 98.4 °F (36.9 °C), temperature source Oral, resp. rate 16, height 162.6 cm (64\"), weight 84.8 kg (187 lb), last menstrual period 06/25/2019, SpO2 98 %, currently breastfeeding.      General:    alert, appears stated age and cooperative   Uterine Fundus:   firm     Labs    Rh + / Female infant    Assessment/Plan:.     Postpartum Day #2  - Discussed discharge.  Discussed expectations, prescriptions and follow up.      Florian Ludwig MD    "

## 2020-03-11 NOTE — PAYOR COMM NOTE
"[facility]  [address] [place] [address]  Facility NPI: [id number]  Renee Rodriguez  Fax: [phone]  Phone: [phone] (Maria Esther: [phone], Toshia: [phone])  Email: [email or web address]    Date: 03/11/2020  To: DALIA BRAUN   Fax: [phone]  Subject: REQUESTED CLINICALS   Reference #: [id number]  Please don't hesitate to contact me with any questions or concerns.    Berkley Herron (39 y.o. Female)     Date of Birth Social Security Number Address Home Phone MRN    1980  [facility] [address]  [id number]    Orthodoxy Marital Status          Unknown        Admission Date Admission Type Admitting Provider Attending Provider Department, Room/Bed    3/8/20 Elective Nishant Azul MD Keller, Cara C, MD [address], [address]/1    Discharge Date Discharge Disposition Discharge Destination         Home or Self Care              Attending Provider:  Shelley Mireles MD    Allergies:  Penicillins    Isolation:  None   Infection:  None   Code Status:  CPR    Ht:  162.6 cm (64\")   Wt:  84.8 kg (187 lb)    Admission Cmt:  None   Principal Problem:  None                Active Insurance as of 3/8/2020     Primary Coverage     Payor Plan Insurance Group Employer/Plan Group    [id number]     Payor Plan Address Payor Plan Phone Number Payor Plan Fax Number Effective Dates    PO    3/8/2020 - None Entered    [facility] [id number]       Subscriber Name Subscriber Birth Date Member ID       BERKLEY HERRON 1980 [id number]           Secondary Coverage     Payor Plan Insurance Group Employer/Plan Group    [employer] [id number]     Payor Plan Address Payor Plan Phone Number Payor Plan Fax Number Effective Dates    PO    11/6/2019 - 3/8/2020    [facility] [phone]       Subscriber Name Subscriber Birth Date Member ID       CHUCKY HERRON [birth year]4/1980 [id number]                 Emergency Contacts     " " (Rel.) Home Phone Work Phone Mobile Phone    Edgardo Meredith \"Roney\" (Spouse) [phone] -- --               History & Physical      Shelley Mireles MD at 20 1008          [facility]  Obstetric History and Physical    Chief Complaint   Patient presents with   • Scheduled Induction     Scheduled IOL for GHTN. Pt reports positive FM, denies LOF or VB        Subjective     Patient is a 39 y.o. female  currently at 37w3d, who presents with plan for induction of labor given preeclampsia without severe features. Patient had new onset blood pressure elevations and elevated P:C.  She denies a headache/vision changes/RUQ pain. She denies any recent HSV outbreaks or prodromal symptoms (her last outbreak was years ago).      This pregnancy has been complicated by AMA, anterior fibroid, Factor V Leiden heterozygote (no h/o VTE).  Her previous obstetric/gynecological history is notable for one prior       Prenatal Information:  Prenatal Results     POC Urine Glucose/Protein     Test Value Reference Range Date Time    Urine Glucose Negative  Negative, 1000 mg/dL (3+) 20     Urine Protein Trace  Negative 20           Initial Prenatal Labs     Test Value Reference Range Date Time    Hemoglobin 12.1 g/dL 11.1 - 15.9 19 1559      12.0 g/dL 12.0 - 15.9 19 0011    Hematocrit 36.7 % 34.0 - 46.6 19 1559      36.6 % 34.0 - 46.6 19 0011    Platelets 231 10*3/mm3 140 - 450 20 2159      225 x10E3/uL 150 - 450 20 1637      248 10*3/mm3 140 - 450 20 0152      261 10*3/mm3 140 - 450 20 1301      334 x10E3/uL 150 - 450 19 1559      307 10*3/mm3 140 - 450 19 0011    Rubella IgG 10.60 index Immune >0.99 19 155    Hepatitis B SAg Negative  Negative 19 155    Hepatitis C Ab <0.1 s/co ratio 0.0 - 0.9 19 155    RPR Non Reactive  Non Reactive 19 1559    ABO A   209    Rh Positive   20    Antibody " Screen Negative  Negative 09/03/19 1559    HIV Non Reactive  Non Reactive 09/03/19 1559    Urine Culture Final report   09/03/19 1628    Gonorrhea Negative  Negative 09/04/19 0835    Chlamydia Negative  Negative 09/04/19 0835    TSH              2nd and 3rd Trimester     Test Value Reference Range Date Time    Hemoglobin (repeated) 9.5 g/dL 12.0 - 15.9 03/08/20 2159      9.9 g/dL 11.1 - 15.9 02/26/20 1637      9.2 g/dL 12.0 - 15.9 02/07/20 0152      9.4 g/dL 12.0 - 15.9 01/14/20 1301    Hematocrit (repeated) 29.4 % 34.0 - 46.6 03/08/20 2159      30.2 % 34.0 - 46.6 02/26/20 1637      28.2 % 34.0 - 46.6 02/07/20 0152      28.4 % 34.0 - 46.6 01/14/20 1301     mg/dL 65 - 179 01/14/20 1301    Antibody Screen (repeated) Negative   03/08/20 2159    GTT Fasting        GTT 1 Hr        GTT 2 Hr        GTT 3 Hr        Group B Strep Negative  Negative 02/26/20 0442          Drug Screening     Test Value Reference Range Date Time    Amphetamine Screen Negative ng/mL Pvhrek=7283 09/03/19 1628    Barbiturate Screen Negative ng/mL Fgywek=848 09/03/19 1628    Benzodiazepine Screen Negative ng/mL Rwqdrn=167 09/03/19 1628    Methadone Screen Negative ng/mL Hkbbkq=160 09/03/19 1628    Phencyclidine Screen Negative ng/mL Cutoff=25 09/03/19 1628    Opiates Screen Negative ng/mL Qfrgzw=376 09/03/19 1628    THC Screen Negative ng/mL Cutoff=50 09/03/19 1628    Cocaine Screen Negative ng/mL Ffoyfv=500 09/03/19 1628    Propoxyphene Screen Negative ng/mL Ohzouv=775 09/03/19 1628    Buprenorphine Screen        Methamphetamine Screen        Oxycodone Screen        Tricyclic Antidepressants Screen              Other (Risk screening)     Test Value Reference Range Date Time    Varicella IgG        Parvovirus IgG        CMV IgG        Cystic Fibrosis        Hemoglobin electrophoresis        NIPT        MSAFP-4        AFP (for NTD only)                  External Prenatal Results     Pregnancy Outside Results - Transcribed From Office Records  - See Scanned Records For Details     Test Value Date Time    Hgb 9.5 g/dL 20 2159      9.9 g/dL 20 1637      9.2 g/dL 20 0152      9.4 g/dL 20 1301      12.1 g/dL 19 1559      12.0 g/dL 19 0011    Hct 29.4 % 20 2159      30.2 % 20 1637      28.2 % 20 0152      28.4 % 20 1301      36.7 % 19 1559      36.6 % 19 0011    ABO A  20 215    Rh Positive  20 215    Antibody Screen Negative  20 2159      Negative  19 1559    Glucose Fasting GTT       Glucose Tolerance Test 1 hour       Glucose Tolerance Test 3 hour       Gonorrhea (discrete) Negative  19 0835    Chlamydia (discrete) Negative  19 0835    RPR Non Reactive  19 1559    VDRL       Syphilis Antibody       Rubella 10.60 index 19 1559    HBsAg Negative  19 1559    Herpes Simplex Virus PCR       Herpes Simplex VIrus Culture       HIV Non Reactive  19 1559    Hep C RNA Quant PCR       Hep C Antibody <0.1 s/co ratio 19 1559    AFP       Group B Strep Negative  20 0442    GBS Susceptibility to Clindamycin       GBS Susceptibility to Erythromycin       Fetal Fibronectin       Genetic Testing, Maternal Blood             Drug Screening     Test Value Date Time    Urine Drug Screen       Amphetamine Screen Negative ng/mL 19 1628    Barbiturate Screen Negative ng/mL 19 1628    Benzodiazepine Screen Negative ng/mL 19 1628    Methadone Screen Negative ng/mL 19 1628    Phencyclidine Screen Negative ng/mL 19 1628    Opiates Screen       THC Screen       Cocaine Screen       Propoxyphene Screen Negative ng/mL 19 1628    Buprenorphine Screen       Methamphetamine Screen       Oxycodone Screen       Tricyclic Antidepressants Screen                    Past OB History:     OB History    Para Term  AB Living   4 1 1 0 2 1   SAB TAB Ectopic Molar Multiple Live Births   1 1 0 0 0 1      #  Outcome Date GA Lbr Severino/2nd Weight Sex Delivery Anes PTL Lv   4 Current            3 Term 04/23/06 42w0d  3033 g (6 lb 11 oz) F Vag-Spont   SAKSHI   2 SAB      SAB      1 TAB      TAB          Past Medical History: Past Medical History:   Diagnosis Date   • Abnormal Pap smear of cervix 2018   • Factor V Leiden carrier (CMS/HCC)    • Herpes    • HPV (human papilloma virus) infection       Past Surgical History Past Surgical History:   Procedure Laterality Date   • TONSILLECTOMY     • WISDOM TOOTH EXTRACTION        Family History: Family History   Problem Relation Age of Onset   • Breast cancer Mother 35   • Diabetes Maternal Grandmother    • Ovarian cancer Neg Hx    • Uterine cancer Neg Hx    • Colon cancer Neg Hx    • Thyroid disease Neg Hx    • Deep vein thrombosis Neg Hx    • Pulmonary embolism Neg Hx       Social History:  reports that she has quit smoking. She started smoking about 23 months ago. She has never used smokeless tobacco.   reports that she does not drink alcohol.   reports that she does not use drugs.        General ROS: Pertinent items are noted in HPI    Objective       Vital Signs Range for the last 24 hours  Temperature: Temp:  [98 °F (36.7 °C)-99.1 °F (37.3 °C)] 98 °F (36.7 °C)   Temp Source: Temp src: Oral   BP: BP: (113-156)/() 119/70   Pulse: Heart Rate:  [] 91   Respirations: Resp:  [16] 16   SPO2: SpO2:  [98 %] 98 %   O2 Amount (l/min):     O2 Devices Device (Oxygen Therapy): room air   Weight: Weight:  [84.8 kg (187 lb)] 84.8 kg (187 lb)     Physical Examination: General appearance - alert, well appearing, and in no distress  Mental status - alert, oriented to person, place, and time  Eyes - sclera anicteric, left eye normal, right eye normal  Chest - no tachypnea, retractions or cyanosis  Heart - normal rate and regular rhythm  Abdomen - soft, nontender, gravid  Pelvic - normal external genitalia, normal vagina, cervix normal, no HSV lesions seen on external or internal  genitalia with sterile speculum exam  Neurological -exam limited by epidural  Musculoskeletal - no joint tenderness, deformity or swelling  Extremities - pedal edema 2 +  Skin - normal coloration and turgor, no rashes, no suspicious skin lesions noted    Presentation: Vertex   Cervix: Exam by: Method: sterile exam per RN   Dilation: Cervical Dilation (cm): 1   Effacement: Cervical Effacement: 60%   Station: Fetal Station: 1--> -2       Fetal Heart Rate Assessment   Method: Fetal HR Assessment Method: external   Beats/min: Fetal HR (beats/min): 130   Baseline: Fetal Heart Baseline Rate: normal range   Varibility: Fetal HR Variability: moderate (amplitude range 6 to 25 bpm)   Accels: Fetal HR Accelerations: greater than/equal to 15 bpm, lasting at least 15 seconds   Decels: Fetal HR Decelerations: absent   Tracing Category:       Uterine Assessment   Method: Method: palpation, external tocotransducer   Frequency (min): Contraction Frequency (Minutes): 1.5-4   Ctx Count in 10 min: Contractions in 10 Minutes: 3   Duration:     Intensity: Contraction Intensity: mild by palpation   Intensity by IUPC:     Resting Tone: Uterine Resting Tone: soft by palpation   Resting Tone by IUPC:     [place] Units:       Lab Results   Component Value Date    WBC 10.52 03/08/2020    HGB 9.5 (L) 03/08/2020    HCT 29.4 (L) 03/08/2020    MCV 81.4 03/08/2020     03/08/2020      US: 2/11/2020 EFW 2256g    Assessment/Plan   Assessment:  1.  Intrauterine pregnancy at 37w3d weeks gestation with reactive fetal status.    2.  Preeclampsia without severe features  3.  GBS status: Negative  4. HSV: no active outbreaks, no lesions, no prodromal symptoms  Plan:  1. IOL:   Cook catheter placed this check.  Cervix 1/70/-2. Patient tolerated well and is comfortable with epidural  2. Preeclampsia without severe features:   BP normal to mild range, asymptomatic.   Will send CMP in addition to CBC   Continue to monitor  3. Factor V Leiden:    SCDs  while immobilized  4. HSV: reviewed with patient. She has no active outbreaks, no recent outbreaks, no evidence of lesions on exam, and no prodromal symptoms. Plan to proceed with IOL.     Plan of care has been reviewed with patient and family.  Risks, benefits of treatment plan have been discussed.  All questions have been answered.      Shelley Mireles MD  3/9/2020  10:08 AM        Electronically signed by Shelley Mireles MD at 03/09/20 1047       Vital Signs (last 7 days)     Date/Time   Temp   Temp src   Pulse   Resp   BP   Patient Position   SpO2    03/11/20 0742   98.6 (37)   Oral   86   20   139/89   Lying   --    03/11/20 0303   98.4 (36.9)   Oral   89   16   147/84   Lying   --    03/10/20 2325   98.2 (36.8)   Oral   85   16   145/82   Lying   --    03/10/20 2018   98.5 (36.9)   Oral   82   16   143/89   Sitting   --    03/10/20 1534   98.3 (36.8)   Oral   87   16   143/91 notified rn of elevated bp   Lying   --    BP: notified rn of elevated bp at 03/10/20 1534    03/10/20 1128   98.7 (37.1)   Oral   96   16   123/79   Lying   --    03/10/20 0758   97.4 (36.3)   Oral   83   16   116/71   Lying   --    03/09/20 2343   98.2 (36.8)   Oral   87   16   144/91   Sitting   --    03/09/20 1835   97.6 (36.4)   Oral   83   16   146/89   Sitting   --    03/09/20 1805   97.7 (36.5)   Oral   87   16   150/82   Sitting   --    03/09/20 1743   --   --   83   18   138/90   --   --    03/09/20 1723   --   --   80   --   149/88   --   --    03/09/20 1708   --   --   82   18   143/89   Lying   --    03/09/20 1655   --   --   --   --   --   --   --    Comment rows:    OBSERV: Notified Dr. Mireles of BP's in the 150s over 90s. MD stated to continue to monitor and do not transport to mother baby at this time. Will continue to assess. at 03/09/20 1655    03/09/20 1653   --   --   87   17   133/82   Lying   --    03/09/20 1638   --   --   81   18   151/95   Lying   --    03/09/20 1623   --   --   91   17   155/97   Lying   --     03/09/20 1608   --   --   102   18   153/88   Lying   --    03/09/20 1554   --   --   87   17   154/91   Lying   --    03/09/20 1538   --   --   87   18   145/81   Lying   --    03/09/20 1524   --   --   88   17   149/78   Lying   --    03/09/20 1517   --   --   81   --   (!) 146/104   --   --    03/09/20 1509   --   --   81   17   (!) 146/104   Lying   --    03/09/20 1454   --   --   81   17   123/77   Lying   --    03/09/20 1438   --   --   --   16   146/86   Lying   --    03/09/20 1423   98 (36.7)   Oral   94   16   143/83   Lying   --    03/09/20 1408   --   --   105   --   135/80   --   --    03/09/20 1354   --   --   104   --   158/76   --   --    03/09/20 1338   --   --   77   --   133/77   --   --    03/09/20 1325   --   --   83   --   140/78   --   --    03/09/20 1315   --   --   85   --   120/81   --   --    03/09/20 1254   --   --   82   --   144/85   --   --    03/09/20 1238   --   --   96   --   131/84   --   --    03/09/20 1224   --   --   79   --   128/72   --   --    03/09/20 1208   --   --   71   --   108/69   --   --    03/09/20 1154   --   --   81   --   115/72   --   --    03/09/20 1138   --   --   78   --   117/78   --   --    03/09/20 1123   --   --   79   --   125/78   --   --    03/09/20 1109   --   --   76   --   124/74   --   --    03/09/20 1053   --   --   78   --   133/79   --   --    03/09/20 1024   --   --   91   --   120/83   --   --    03/09/20 1009   --   --   77   --   131/66   --   --    03/09/20 0953   --   --   91   --   119/70   --   --    03/09/20 0951   --   --   86   --   117/69   --   --    03/09/20 0950   98 (36.7)   Oral   --   --   --   --   --    03/09/20 0949   --   --   86   --   124/65   --   --    03/09/20 0947   --   --   89   --   131/75   --   --    03/09/20 0945   --   --   100   --   122/75   --   --    03/09/20 0943   --   --   91   --   142/84   --   --    03/09/20 0941   --   --   87   --   145/87   --   --    03/09/20 0939   --   --   85   --   137/78   --    --    03/09/20 0937   --   --   90   --   131/90   --   --    03/09/20 0935   --   --   86   --   132/82   --   --    03/09/20 0933   --   --   85   --   137/84   --   --    03/09/20 0931   --   --   89   --   143/86   --   --    03/09/20 0929   --   --   85   --   150/92   --   --    03/09/20 0927   --   --   90   --   156/93   --   --    03/09/20 0925   --   --   95   --   (!) 155/108   --   --    03/09/20 0923   --   --   97   --   (!) 155/105   --   --    03/09/20 0915   --   --   94   --   150/99   --   --    03/09/20 0844   --   --   90   --   130/83   --   --    03/09/20 0815   --   --   80   --   120/81   --   --    03/09/20 0715   98.4 (36.9)   Oral   77   16   135/95   Sitting   --    03/09/20 0712   --   --   75   --   135/87   --   --    03/09/20 0615   --   --   80   --   130/82   --   --    03/09/20 0545   99.1 (37.3)   Oral   81   16   130/74   Sitting   --    03/09/20 0515   --   --   86   --   122/70   --   --    03/09/20 0445   --   --   79   --   119/68   --   --    03/09/20 0415   --   --   84   --   141/87   --   --    03/09/20 0345   --   --   84   --   125/67   --   --    03/09/20 0315   --   --   81   --   113/72   --   --    03/09/20 0245   --   --   84   --   128/85   --   --    03/09/20 0145   --   --   83   --   143/88   --   --    03/09/20 0115   --   --   85   --   131/85   --   --    03/09/20 0045   --   --   80   --   119/76   --   --    03/09/20 0015   --   --   84   --   119/68   --   --    03/08/20 2345   --   --   90   --   135/82   --   --    03/08/20 2246   --   --   93   --   138/87   --   --    03/08/20 2136   98.6 (37)   Oral   --   16   --   Sitting   98              Intake & Output (last 7 days)       03/04 0701 - 03/05 0700 03/05 0701 - 03/06 0700 03/06 0701 - 03/07 0700 03/07 0701 - 03/08 0700 03/08 0701 - 03/09 0700 03/09 0701 - 03/10 0700 03/10 0701 - 03/11 0700 03/11 0701 - 03/12 0700    I.V. (mL/kg)     1305 (15.4) 2834 (33.4)      Total Intake(mL/kg)     1305 (15.4)  2834 (33.4)      Urine (mL/kg/hr)     700 200 (0.1)      Blood      300      Total Output     700 500      Net     [phone]                        Prior to Admission Medications     Prescriptions Last Dose Informant Patient Reported? Taking?    famotidine (PEPCID) 20 MG tablet Past Week  No Yes    Take 1 tablet by mouth Daily.    ferrous sulfate 325 (65 FE) MG tablet Past Week  No Yes    Take 1 tablet by mouth Daily With Breakfast.    Prenatal Vit-Fe Fumarate-FA (PRENATAL, CLASSIC, VITAMIN) 28-0.8 MG tablet tablet Past Week  Yes Yes    Take  by mouth Daily.          Current Facility-Administered Medications   Medication Dose Route Frequency Provider Last Rate Last Dose   • all purpose nipple ointment   Topical 4x Daily PRN Shelley Mireles MD       • benzocaine (AMERICAINE) 20 % rectal ointment 1 application  1 application Rectal PRN Shelley Mireles MD       • benzocaine-lanolin-aloe vera (DERMOPLAST) 20-0.5 % topical spray   Topical PRN Shelley Mireles MD       • bisacodyl (DULCOLAX) suppository 10 mg  10 mg Rectal Daily PRN Shelley Mireles MD       • calcium carbonate (TUMS) chewable tablet 500 mg (200 mg elemental)  3 tablet Oral TID PRN Shelley Mireles MD   3 tablet at 03/10/20 1036   • docusate sodium (COLACE) capsule 100 mg  100 mg Oral BID PRN Shelley Mireles MD   100 mg at 03/11/20 0814   • hydrocortisone (ANUSOL-HC) 2.5 % rectal cream 1 application  1 application Rectal PRN Shelley Mireles MD   1 application at 03/11/20 1010   • hydrocortisone-pramoxine (PROCTOFOAM-HS) 1-1 % rectal foam   Topical PRN Shelley Mireles MD       • hydrOXYzine (ATARAX) tablet 50 mg  50 mg Oral Nightly PRN Shelley Mireles MD       • ibuprofen (ADVIL,MOTRIN) tablet 600 mg  600 mg Oral Q8H PRN Shelley Mireles MD   600 mg at 03/11/20 0538   • magnesium hydroxide (MILK OF MAGNESIA) suspension 2400 mg/10mL 10 mL  10 mL Oral Daily PRN Shelley Mireles MD       • ondansetron (ZOFRAN) tablet 4 mg  4 mg Oral Q8H PRN Shelley Mireles MD        • oxyCODONE-acetaminophen (PERCOCET) 5-325 MG per tablet 1 tablet  1 tablet Oral Q4H PRN Shelley Mireles MD   1 tablet at 03/11/20 0814   • prenatal (CLASSIC) vitamin tablet 1 tablet  1 tablet Oral Daily Shelley Mireles MD   1 tablet at 03/10/20 0825   • sodium chloride 0.9 % flush 1-10 mL  1-10 mL Intravenous PRN Shelley Mireles MD         Medication Administration Report for Margaret Meredith as of 03/11/20 1059   Legend:    Given Hold Not Given Due Canceled Entry Other Actions    Time Time (Time) Time  Time-Action       Discontinued   Completed   Future   MAR Hold    Linked        Medications 03/05/20 03/06/20 03/07/20 03/08/20 03/09/20 03/10/20 03/11/20   Completed Medications    oxytocin in sodium chloride (PITOCIN) 30 UNIT/500ML infusion solution  Dose: 999 mL/hr  Freq: Once Route: IV  Start: 03/09/20 1430   End: 03/09/20 1426   Admin Instructions: Infuse 250 ml at 999 ml/hr.         1411       1426            Followed by  oxytocin in sodium chloride (PITOCIN) 30 UNIT/500ML infusion solution  Rate: 250 mL/hr Dose: 250 mL/hr  Freq: Continuous Route: IV  Start: 03/09/20 1445   End: 03/09/20 1525   Admin Instructions: Infuse at 250 ml/hr for remaining 250 ml/hr in bag.         1426       1538            Followed by  oxytocin in sodium chloride (PITOCIN) 30 UNIT/500ML infusion solution  Rate: 125 mL/hr Dose: 125 mL/hr  Freq: Continuous PRN Route: IV  PRN Comment: PRN bleeding for 1 bag.  Start: 03/09/20 1526   End: 03/09/20 1538        1538            Discontinued Medications  Medications 03/05/20 03/06/20 03/07/20 03/08/20 03/09/20 03/10/20 03/11/20       fentaNYL (2 mcg/mL) and ropivacaine (0.2%) in 100 mL  Rate: 10 mL/hr Freq: Continuous Route: EP  Start: 03/08/20 2200   End: 03/09/20 1823   Admin Instructions: Anesthesiologist To Adjust Rate As Needed.  RN May Replace Epidural Infusion Fluids As Ordered  Caution: Look alike/sound alike drug alert        [phone]             lactated ringers  infusion  Rate: 125 mL/hr Dose: 125 mL/hr  Freq: Continuous Route: IV  Start: 03/09/20 0745   End: 03/09/20 1823       2200        0045 [C]       0845       0912       0913 [C]       0940       1321       1535             oxytocin in sodium chloride (PITOCIN) 30 UNIT/500ML infusion solution  Rate: 2-30 mL/hr Dose: 2-30 radha-units/min  Freq: Titrated Route: IV  Start: 03/08/20 2145   End: 03/09/20 1817   Admin Instructions: Start at 2 radha units/min and increase by 2 radha-units every 30 min to a max of 20 radha-units/min. Titrate to maternal / fetal tolerance, with a goal of achieving an adequate labor pattern defined by progressive cervical effacement and cervical dilation of approximately 0.5 cm/hr to 1 cm/hr once active labor is achieved. Contractions should not be more frequent than every 2 minutes. Notify physician for tachysystole        2201        0700              0954       1135       1319       1411                   and       Lab Results (last 7 days)     Procedure Component Value Units Date/Time    CBC & Differential [[id number]] Collected:  03/10/20 0607    Specimen:  Blood Updated:  03/10/20 0754    Narrative:       The following orders were created for panel order CBC & Differential.  Procedure                               Abnormality         Status                     ---------                               -----------         ------                     CBC Auto Differential[[id number]]        Abnormal            Final result                 Please view results for these tests on the individual orders.    CBC Auto Differential [[id number]]  (Abnormal) Collected:  03/10/20 0607    Specimen:  Blood Updated:  03/10/20 0754     WBC 12.74 10*3/mm3      RBC 2.93 10*6/mm3      Hemoglobin 7.8 g/dL      Hematocrit 23.8 %      MCV 81.2 fL      MCH 26.6 pg      MCHC 32.8 g/dL      RDW 14.4 %      RDW-SD 41.4 fl      MPV 12.9 fL      Platelets 157 10*3/mm3      Neutrophil % 75.9 %      Lymphocyte % 14.4 %       Monocyte % 6.3 %      Eosinophil % 2.7 %      Basophil % 0.2 %      Neutrophils, Absolute 9.66 10*3/mm3      Lymphocytes, Absolute 1.84 10*3/mm3      Monocytes, Absolute 0.80 10*3/mm3      Eosinophils, Absolute 0.34 10*3/mm3      Basophils, Absolute 0.03 10*3/mm3     Comprehensive Metabolic Panel [[id number]]  (Abnormal) Collected:  03/09/20 1045    Specimen:  Blood from Arm, Right Updated:  03/09/20 1125     Glucose 100 mg/dL      BUN 9 mg/dL      Creatinine 0.68 mg/dL      Sodium 134 mmol/L      Potassium 4.1 mmol/L      Chloride 103 mmol/L      CO2 18.7 mmol/L      Calcium 8.4 mg/dL      Total Protein 6.0 g/dL      Albumin 3.10 g/dL      ALT (SGPT) 5 U/L      AST (SGOT) 8 U/L      Alkaline Phosphatase 182 U/L      Total Bilirubin <0.2 mg/dL      eGFR Non African Amer 97 mL/min/1.73      Globulin 2.9 gm/dL      A/G Ratio 1.1 g/dL      BUN/Creatinine Ratio 13.2     Anion Gap 12.3 mmol/L     Narrative:       GFR Normal >60  Chronic Kidney Disease <60  Kidney Failure <15      CBC (No Diff) [[id number]]  (Abnormal) Collected:  03/08/20 2159    Specimen:  Blood from Arm, Left Updated:  03/08/20 2210     WBC 10.52 10*3/mm3      RBC 3.61 10*6/mm3      Hemoglobin 9.5 g/dL      Hematocrit 29.4 %      MCV 81.4 fL      MCH 26.3 pg      MCHC 32.3 g/dL      RDW 14.5 %      RDW-SD 42.3 fl      MPV 13.5 fL      Platelets 231 10*3/mm3           Imaging Results (Last 7 Days)     ** No results found for the last 168 hours. **        ECG/EMG Results (last 7 days)     ** No results found for the last 168 hours. **           Operative/Procedure Notes (last 7 days) (Notes from 03/04/20 1059 through 03/11/20 1059)      Shelley Mireles MD at 03/09/20 1421          [facility]  Vaginal Delivery Note    Delivery     Delivery: Vaginal, Spontaneous     YOB: 2019    Time of Birth:  Gestational Age 2:09 PM   37w3d     Anesthesia:       Delivering clinician:      Forceps?   No   Vacuum? No    Shoulder dystocia present: No         Delivery narrative:  Preop diagnosis:  39 y.o.  year old  at 37w3d      Preeclampsia without severe features  Postop diagnosis: Same    Indications: Margaret Meredith is a 39 y.o.  who presented at 37w3d with plan for induction of labor secondary to preeclampsia without severe features.  She was induced with misoprostol, pitocin and Cook catheter.  After the Cook catheter came out she progressed rapidly to complete dilation    Findings: Female infant, Apgar 9/9, Weight pending; second degree lacerations noted and repaired;     Procedure:The cervix was noted to be completely dilated, completely effaced, and +3 station. Under continuous electronic fetal heart rate monitoring, the patient was encouraged to push. With good maternal effort she delivered a viable, vigorous female infant. The head presented in the OA presentation and restituted to maternal left. There was no nuchal cord present. The left anterior fetal shoulder was then easily delivered rapidly following the head and the posterior fetal shoulder, followed by the remainder of the infant without difficulty. The infant was immediately vigorous. The cord was clamped 30 seconds following delivery. The cord was cut and the infant was placed to maternal abdomen. Cord blood and gases were then collected. The placenta then delivered spontaneously intact, and a three vessel cord was noted. Uterine massage and IV Pitocin were given until the fundus was firm. The cervix, vagina, perineum, and rectum were carefully inspected for lacerations and a second degree was noted. This was repaired in standard fashion with 3-0 vicryl. Counts for needles, sponges, laps and instruments were correct times two at the end of the delivery. There were no sponges left in the vagina. There were no major complications. Mother and baby were bonding well at the end of the delivery.              Infant    Findings: female  infant     Infant observations: Weight: No birth  weight on file.   Length:    in  Observations/Comments:  scale 4      Apgars: 9   @ 1 minute /    9   @ 5 minutes   Infant Name: Patricia Silvestre     Placenta, Cord, and Fluid    Placenta delivered  Spontaneous  at   3/9/2020  2:11 PM     Cord:    present.   Nuchal Cord?  no   Cord blood obtained:      Cord gases obtained:       Cord gas results: Venous:  No results found for: PHCVEN    Arterial:  No results found for: PHCART     Repair    Episiotomy: Not recorded     No    Lacerations: Yes  Laceration Information  Laceration Repaired?   Perineal:         Periurethral:         Labial:         Sulcus:         Vaginal:         Cervical:           Suture used for repair: 3-0 Vicryl   Estimated Blood Loss:             Complications  Preeclampsia without severe features    Disposition  Mother to Mother Baby/Postpartum  in stable condition currently.  Baby to remains with mom  in stable condition currently.      Shelley Mireles MD  03/09/20  2:26 PM        Electronically signed by Shelley Mireles MD at 03/09/20 1426         Maternal Vitals (last 7 days)     Date/Time   Temp   Pulse   Resp   BP   SpO2   Weight    03/11/20 0742   98.6 (37)   86   20   139/89   --   --    03/11/20 0303   98.4 (36.9)   89   16   147/84   --   --    03/10/20 2325   98.2 (36.8)   85   16   145/82   --   --    03/10/20 2018   98.5 (36.9)   82   16   143/89   --   --    03/10/20 1534   98.3 (36.8)   87   16   143/91 notified rn of elevated bp   --   --    BP: notified rn of elevated bp at 03/10/20 1534    03/10/20 1128   98.7 (37.1)   96   16   123/79   --   --    03/10/20 0758   97.4 (36.3)   83   16   116/71   --   --    03/09/20 2343   98.2 (36.8)   87   16   144/91   --   --    03/09/20 1835   97.6 (36.4)   83   16   146/89   --   --    03/09/20 1805   97.7 (36.5)   87   16   150/82   --   --    03/09/20 1743   --   83   18   138/90   --   --    03/09/20 1723   --   80   --   149/88   --   --    03/09/20 1708   --   82   18   143/89   --   --     03/09/20 1655   --   --   --   --   --   --    Comment rows:    OBSERV: Notified Dr. Mireles of BP's in the 150s over 90s. MD stated to continue to monitor and do not transport to mother baby at this time. Will continue to assess. at 03/09/20 1655    03/09/20 1653   --   87   17   133/82   --   --    03/09/20 1638   --   81   18   151/95   --   --    03/09/20 1623   --   91   17   155/97   --   --    03/09/20 1608   --   102   18   153/88   --   --    03/09/20 1554   --   87   17   154/91   --   --    03/09/20 1538   --   87   18   145/81   --   --    03/09/20 1524   --   88   17   149/78   --   --    03/09/20 1517   --   81   --   (!) 146/104   --   --    03/09/20 1509   --   81   17   (!) 146/104   --   --    03/09/20 1454   --   81   17   123/77   --   --    03/09/20 1438   --   --   16   146/86   --   --    03/09/20 1423   98 (36.7)   94   16   143/83   --   --    03/09/20 1408   --   105   --   135/80   --   --    03/09/20 1354   --   104   --   158/76   --   --    03/09/20 1338   --   77   --   133/77   --   --    03/09/20 1325   --   83   --   140/78   --   --    03/09/20 1315   --   85   --   120/81   --   --    03/09/20 1254   --   82   --   144/85   --   --    03/09/20 1238   --   96   --   131/84   --   --    03/09/20 1224   --   79   --   128/72   --   --    03/09/20 1208   --   71   --   108/69   --   --    03/09/20 1154   --   81   --   115/72   --   --    03/09/20 1138   --   78   --   117/78   --   --    03/09/20 1123   --   79   --   125/78   --   --    03/09/20 1109   --   76   --   124/74   --   --    03/09/20 1053   --   78   --   133/79   --   --    03/09/20 1024   --   91   --   120/83   --   --    03/09/20 1009   --   77   --   131/66   --   --    03/09/20 0953   --   91   --   119/70   --   --    03/09/20 0951   --   86   --   117/69   --   --    03/09/20 0950   98 (36.7)   --   --   --   --   --    03/09/20 0949   --   86   --   124/65   --   --    03/09/20 0947   --   89   --   131/75    --   --    03/09/20 0945   --   100   --   122/75   --   --    03/09/20 0943   --   91   --   142/84   --   --    03/09/20 0941   --   87   --   145/87   --   --    03/09/20 0939   --   85   --   137/78   --   --    03/09/20 0937   --   90   --   131/90   --   --    03/09/20 0935   --   86   --   132/82   --   --    03/09/20 0933   --   85   --   137/84   --   --    03/09/20 0931   --   89   --   143/86   --   --    03/09/20 0929   --   85   --   150/92   --   --    03/09/20 0927   --   90   --   156/93   --   --    03/09/20 0925   --   95   --   (!) 155/108   --   --    03/09/20 0923   --   97   --   (!) 155/105   --   --    03/09/20 0915   --   94   --   150/99   --   --    03/09/20 0844   --   90   --   130/83   --   --    03/09/20 0815   --   80   --   120/81   --   --    03/09/20 0715   98.4 (36.9)   77   16   135/95   --   --    03/09/20 0712   --   75   --   135/87   --   --    03/09/20 0615   --   80   --   130/82   --   --    03/09/20 0545   99.1 (37.3)   81   16   130/74   --   --    03/09/20 0515   --   86   --   122/70   --   --    03/09/20 0445   --   79   --   119/68   --   --    03/09/20 0415   --   84   --   141/87   --   --    03/09/20 0345   --   84   --   125/67   --   --    03/09/20 0315   --   81   --   113/72   --   --    03/09/20 0245   --   84   --   128/85   --   --    03/09/20 0145   --   83   --   143/88   --   --    03/09/20 0115   --   85   --   131/85   --   --    03/09/20 0045   --   80   --   119/76   --   --    03/09/20 0015   --   84   --   119/68   --   --    03/08/20 2345   --   90   --   135/82   --   --    03/08/20 2246   --   93   --   138/87   --   --    03/08/20 2136   98.6 (37)   --   16   --   98   --    03/08/20 2101   --   --   --   --   --   84.8 (187)            FHR (last 7 days)     Date/Time Fetal HR Assessment Method Fetal HR (beats/min) Fetal Heart Baseline Rate Fetal HR Variability Fetal HR Accelerations Fetal HR Decelerations Fetal HR Tracing Category    03/09/20  1400  external  135  normal range  moderate (amplitude range 6 to 25 bpm)  prolonged;greater than/equal to 15 bpm;lasting at least 15 seconds  late;variable  --    03/09/20 1330  external  130  normal range  moderate (amplitude range 6 to 25 bpm)  prolonged;greater than/equal to 15 bpm;lasting at least 15 seconds  late  --    03/09/20 1309  external  135  normal range  moderate (amplitude range 6 to 25 bpm)  greater than/equal to 15 bpm;lasting at least 15 seconds  late;variable  --    03/09/20 1300  --  --  --  --  --  --  --    03/09/20 1230  external  130  normal range  moderate (amplitude range 6 to 25 bpm)  greater than/equal to 15 bpm;lasting at least 15 seconds  late;variable  --    03/09/20 1200  external  130  normal range  moderate (amplitude range 6 to 25 bpm)  greater than/equal to 15 bpm;lasting at least 15 seconds  absent  --    03/09/20 1130  external  125  normal range  moderate (amplitude range 6 to 25 bpm)  greater than/equal to 15 bpm;lasting at least 15 seconds  absent  --    03/09/20 1100  external  135  normal range  moderate (amplitude range 6 to 25 bpm)  greater than/equal to 15 bpm;lasting at least 15 seconds  absent  --    03/09/20 1030  external  130  normal range  moderate (amplitude range 6 to 25 bpm)  greater than/equal to 15 bpm;lasting at least 15 seconds  absent  --    03/09/20 1000  external  120  normal range  moderate (amplitude range 6 to 25 bpm)  greater than/equal to 15 bpm;lasting at least 15 seconds  absent  --    03/09/20 0930  external  130  normal range  moderate (amplitude range 6 to 25 bpm)  greater than/equal to 15 bpm;lasting at least 15 seconds  absent  --    03/09/20 0900  external  130  normal range  moderate (amplitude range 6 to 25 bpm)  greater than/equal to 15 bpm;lasting at least 15 seconds  absent  --    03/09/20 0830  external  130  normal range  moderate (amplitude range 6 to 25 bpm)  greater than/equal to 15 bpm;lasting at least 15 seconds  absent  --     03/09/20 0800  external  120  normal range  moderate (amplitude range 6 to 25 bpm)  greater than/equal to 15 bpm;lasting at least 15 seconds  absent  --    03/09/20 0730  external  120  normal range  moderate (amplitude range 6 to 25 bpm)  greater than/equal to 15 bpm;lasting at least 15 seconds  absent  --    03/09/20 0700  external  120  normal range  moderate (amplitude range 6 to 25 bpm)  greater than/equal to 15 bpm;lasting at least 15 seconds  absent  --    03/09/20 0630  external  120  normal range  moderate (amplitude range 6 to 25 bpm)  greater than/equal to 15 bpm;lasting at least 15 seconds  --  --    03/09/20 0600  external  125  normal range  moderate (amplitude range 6 to 25 bpm)  greater than/equal to 15 bpm;lasting at least 15 seconds  variable  --    03/09/20 0530  external  125  normal range  moderate (amplitude range 6 to 25 bpm)  greater than/equal to 15 bpm;lasting at least 15 seconds  variable  --    03/09/20 0500  external  125  normal range  moderate (amplitude range 6 to 25 bpm)  greater than/equal to 15 bpm;lasting at least 15 seconds  absent  --    03/09/20 0430  external  125  normal range  moderate (amplitude range 6 to 25 bpm)  greater than/equal to 15 bpm;lasting at least 15 seconds  absent  --    03/09/20 0400  external  -- 130  normal range  moderate (amplitude range 6 to 25 bpm)  greater than/equal to 15 bpm;lasting at least 15 seconds  absent  --    03/09/20 0330  external  125  normal range  moderate (amplitude range 6 to 25 bpm)  greater than/equal to 15 bpm;lasting at least 15 seconds  absent  --    03/09/20 0300  external  135  normal range  moderate (amplitude range 6 to 25 bpm)  greater than/equal to 15 bpm;lasting at least 15 seconds  absent  --    03/09/20 0230  external  140  normal range  moderate (amplitude range 6 to 25 bpm)  greater than/equal to 15 bpm;lasting at least 15 seconds  absent  --    03/09/20 0200  external  135  normal range  moderate (amplitude range 6 to  25 bpm)  greater than/equal to 15 bpm;lasting at least 15 seconds  absent  --    03/09/20 0130  external  135  normal range  moderate (amplitude range 6 to 25 bpm)  greater than/equal to 15 bpm;lasting at least 15 seconds  absent  --    03/09/20 0100  external  135  normal range  moderate (amplitude range 6 to 25 bpm)  greater than/equal to 15 bpm;lasting at least 15 seconds  absent  --    03/09/20 0030  external  135  normal range  moderate (amplitude range 6 to 25 bpm)  greater than/equal to 15 bpm;lasting at least 15 seconds  absent  --    03/09/20 0000  external  130  normal range  moderate (amplitude range 6 to 25 bpm)  greater than/equal to 15 bpm;lasting at least 15 seconds  absent  --    03/08/20 2330  external  135  normal range  moderate (amplitude range 6 to 25 bpm)  greater than/equal to 15 bpm;lasting at least 15 seconds  early  --    03/08/20 2300  --  130  normal range  moderate (amplitude range 6 to 25 bpm)  greater than/equal to 15 bpm;lasting at least 15 seconds  absent  --    03/08/20 2230  --  130  normal range  moderate (amplitude range 6 to 25 bpm)  greater than/equal to 15 bpm;lasting at least 15 seconds  absent  --    03/08/20 2200  external  135  normal range  moderate (amplitude range 6 to 25 bpm)  greater than/equal to 15 bpm;lasting at least 15 seconds  absent  --        Uterine Activity (last 7 days)     Date/Time Method Contraction Frequency (Minutes) Contraction Duration (sec) Contraction Intensity Uterine Resting Tone Contraction Pattern    03/09/20 1400  palpation;external tocotransducer  1-3  40-70  moderate by palpation  soft by palpation  Regular    03/09/20 1330  palpation;external tocotransducer  1-3  60-80  --  --  --    03/09/20 1309  palpation;external tocotransducer  1-3  40-70  moderate by palpation  soft by palpation  Regular    03/09/20 1300  --  --  --  --  --  --    03/09/20 1230  palpation;external tocotransducer  1-4  60-80  mild by palpation  soft by palpation   Regular    03/09/20 1200  palpation;external tocotransducer  2-5  60-80  mild by palpation  soft by palpation  Regular    03/09/20 1130  palpation;external tocotransducer  1-4  50-70  mild by palpation  soft by palpation  Regular    03/09/20 1100  palpation;external tocotransducer  1-5  50-80  mild by palpation  soft by palpation  Regular    03/09/20 1030  palpation;external tocotransducer  1-3  50-70  mild by palpation  soft by palpation  Regular    03/09/20 1000  palpation;external tocotransducer  1-5  50-70  mild by palpation  soft by palpation  Irregular    03/09/20 0930  palpation;external tocotransducer  2-3  60-80  mild by palpation  soft by palpation  Regular    03/09/20 0900  palpation;external tocotransducer  2-4  50-70  mild by palpation  soft by palpation  Regular    03/09/20 0830  palpation;external tocotransducer  1.5-4  50-60  mild by palpation  soft by palpation  --    03/09/20 0800  palpation;external tocotransducer  1-4  50-60  mild by palpation  soft by palpation  Irregular    03/09/20 0730  external tocotransducer  2-5  40-70  mild by palpation  soft by palpation  Irregular    03/09/20 0700  external tocotransducer  x3  50-70  --  --  --    03/09/20 0630  external tocotransducer  -- difficulty tracing, pt reported every 5 minutes  --  --  soft by palpation  --    03/09/20 0600  external tocotransducer  --  --  no contractions  --  --    03/09/20 0530  external tocotransducer  2-4  50-60  --  --  --    03/09/20 0500  external tocotransducer  1-4  40-70  mild by palpation  soft by palpation  --    03/09/20 0430  external tocotransducer  3-5  50-60  mild by palpation  soft by palpation  Irritability    03/09/20 0400  external tocotransducer  2-5  40-60  --  --  --    03/09/20 0330  external tocotransducer  3-7  40-80  mild by palpation  soft by palpation  --    03/09/20 0300  external tocotransducer  2.5-5.5    mild by palpation  soft by palpation  --    03/09/20 0230  external tocotransducer   3-5  60-80  mild by palpation  soft by palpation  --    03/09/20 0200  external tocotransducer  2-7    --  --  --    03/09/20 0130  external tocotransducer  2.5-9  40-90  mild by palpation  soft by palpation  Irritability    03/09/20 0100  external tocotransducer  x2  60-70  mild by palpation  soft by palpation  Irritability    03/09/20 0030  external tocotransducer  x3  50-80  mild by palpation  soft by palpation  Irritability    03/09/20 0000  external tocotransducer;palpation  4-9    mild by palpation  soft by palpation  Irritability    03/08/20 2330  external tocotransducer;palpation  4-9  40-70  mild by palpation  soft by palpation  Irritability    03/08/20 2300  --  x1  60  mild by palpation  soft by palpation  Irritability    03/08/20 2230  --  x3  40-70  mild by palpation  soft by palpation  --    03/08/20 2200  external tocotransducer;palpation  3-4  40-70  mild by palpation to moderate  soft by palpation  --        Labor Pain (last 7 days)     Date/Time Pain Rating (0-10): Rest Pain Rating (0-10): Activity Pain Management Interventions    03/11/20 1010  4  --  no interventions per patient request    03/11/20 0910  6  --  --    03/11/20 0814  8  --  see MAR    03/11/20 0538  --  10  see MAR    03/10/20 2143  8  --  see MAR    03/10/20 1553  8  --  see MAR    03/10/20 1142  8  --  see MAR    03/10/20 0825  8  --  see MAR    03/10/20 0638  6  --  see MAR    03/10/20 0238  --  4  cold applied;relaxation techniques promoted    03/10/20 0138  --  9  see MAR    03/10/20 0045  --  10  see MAR    03/09/20 2213  2  --  cold applied;pain management plan reviewed with patient/caregiver    03/09/20 2113  8  --  see MAR    03/09/20 2100  8  --  other (see comments) MD notified    03/09/20 2001  8  --  pain management plan reviewed with patient/caregiver;see MAR    03/09/20 1835  0  --  --    03/09/20 1805  0  --  --    03/09/20 1743  2  --  --    03/09/20 1708  5  --  --    03/09/20 1653  5  --  --     03/09/20 1638  5  --  --    03/09/20 1623  4  --  --    03/09/20 1608  4  --  --    03/09/20 1554  4  --  --    03/09/20 1538  4  --  --    03/09/20 1524  0  0  --    03/09/20 1509  0  0  --    03/09/20 1454  0  0  --    03/09/20 1438  0  0  --    03/09/20 1428  0  0  --    03/09/20 1423  0  0  --    03/09/20 0950  0  0  --    03/09/20 0909  8  --  --    03/09/20 0715  7  7  --    03/09/20 0546  0  0  --    03/09/20 0438  7  7  see MAR    03/08/20 2320  4  4  --    03/08/20 2136  3  3  --

## 2020-03-11 NOTE — PLAN OF CARE
Problem: Patient Care Overview  Goal: Plan of Care Review  Outcome: Ongoing (interventions implemented as appropriate)  Goal: Individualization and Mutuality  Outcome: Ongoing (interventions implemented as appropriate)  Goal: Discharge Needs Assessment  Outcome: Ongoing (interventions implemented as appropriate)  Goal: Interprofessional Rounds/Family Conf  Outcome: Ongoing (interventions implemented as appropriate)     Problem: Fall Risk,  (Adult,Obstetrics,Pediatric)  Goal: Identify Related Risk Factors and Signs and Symptoms  Outcome: Ongoing (interventions implemented as appropriate)  Goal: Absence of Maternal Fall  Outcome: Ongoing (interventions implemented as appropriate)  Goal: Absence of  Fall/Drop  Outcome: Ongoing (interventions implemented as appropriate)     Problem: Skin Injury Risk (Adult)  Goal: Identify Related Risk Factors and Signs and Symptoms  Outcome: Ongoing (interventions implemented as appropriate)  Goal: Skin Health and Integrity  Outcome: Ongoing (interventions implemented as appropriate)     Problem: Postpartum (Vaginal Delivery) (Adult,Obstetrics,Pediatric)  Goal: Signs and Symptoms of Listed Potential Problems Will be Absent, Minimized or Managed (Postpartum)  Outcome: Ongoing (interventions implemented as appropriate)     Problem: Anesthesia/Analgesia, Neuraxial (Obstetrics)  Goal: Signs and Symptoms of Listed Potential Problems Will be Absent, Minimized or Managed (Anesthesia/Analgesia, Neuraxial)  Outcome: Ongoing (interventions implemented as appropriate)     Problem: Breastfeeding (Pediatric,,NICU)  Goal: Identify Related Risk Factors and Signs and Symptoms  Outcome: Ongoing (interventions implemented as appropriate)  Goal: Effective Breastfeeding  Outcome: Ongoing (interventions implemented as appropriate)

## 2020-03-13 NOTE — PAYOR COMM NOTE
"[facility]   &  [facility] [place]  [address]    [address] [place] [address]    [place] [address]    Jaclynjorge Dnaiel  Utilization Review/Room Reservations  Phone: [phone], [phone] or [phone]  Fax: [phone]  Email: [email or web address]  Please call, fax back, or email with authorization or any questions! Thanks!      REQUESTED D/C SUMMARY  ID#[id number]  AUTH#[id number]          This fax contains any of the following:  Face Sheet, H&P, progress notes, consults, orders, meds, lab results, labor record, vitals, delivery worksheet, op note, d/c summary.  Berkley Herron (39 y.o. Female)     Date of Birth Social Security Number Address Home Phone MRN    1980  [facility] [facility] [address]  [id number]    [employer] Marital Status          Unknown        Admission Date Admission Type Admitting Provider Attending Provider Department, Room/Bed    3/8/20 Elective Nishant Azul MD  [facility] 3 [facility], S313/1    Discharge Date Discharge Disposition Discharge Destination        3/11/2020 Home or Self Care              Attending Provider:  (none)   Allergies:  Penicillins    Isolation:  None   Infection:  None   Code Status:  Prior    Ht:  162.6 cm (64\")   Wt:  84.8 kg (187 lb)    Admission Cmt:  None   Principal Problem:  None                Active Insurance as of 3/8/2020     Primary Coverage     Payor Plan Insurance Group Employer/Plan Group    [employer] [facility] [employer] [facility] [place]     Payor Plan Address Payor Plan Phone Number Payor Plan Fax Number Effective Dates    PO    3/8/2020 - None Entered    [facility] [id number]       Subscriber Name Subscriber Birth Date Member ID       BERKLEY HERRON 1980 [id number]                 Emergency Contacts      (Rel.) Home Phone Work Phone Mobile Phone    Edgardo Herron \"Roney\" (Spouse) [phone] -- --            Vani " "Florian Howe MD   Physician   Obstetrics   Progress Notes   Signed   Date of Service:  03/11/20 0732   Creation Time:  03/11/20 0732            Signed             Subjective:  Postpartum Day 2:      The patient feels well.  Pain is well controlled with current medications. The baby is well.  Urinary output is adequate. The patient is ambulating well. The patient is tolerating a normal diet. Flatus has been passed.        Objective:     Vital signs in last 24 hours:  Blood pressure 147/84, pulse 89, temperature 98.4 °F (36.9 °C), temperature source Oral, resp. rate 16, height 162.6 cm (64\"), weight 84.8 kg (187 lb), last menstrual period 06/25/2019, SpO2 98 %, currently breastfeeding.        General:    alert, appears stated age and cooperative   Uterine Fundus:   firm      Labs     Rh + / Female infant     Assessment/Plan:.      Postpartum Day #2  - Discussed discharge.  Discussed expectations, prescriptions and follow up.        Florian Ludwig MD                   Admission (Discharged) on 3/8/2020          Detailed Report            "

## 2020-08-22 ENCOUNTER — APPOINTMENT (OUTPATIENT)
Dept: CARDIOLOGY | Facility: HOSPITAL | Age: 40
End: 2020-08-22

## 2020-08-22 ENCOUNTER — HOSPITAL ENCOUNTER (EMERGENCY)
Facility: HOSPITAL | Age: 40
Discharge: HOME | End: 2020-08-22
Attending: EMERGENCY MEDICINE | Admitting: EMERGENCY MEDICINE

## 2020-08-22 VITALS
BODY MASS INDEX: 24.75 KG/M2 | DIASTOLIC BLOOD PRESSURE: 78 MMHG | HEART RATE: 67 BPM | WEIGHT: 145 LBS | OXYGEN SATURATION: 99 % | SYSTOLIC BLOOD PRESSURE: 121 MMHG | TEMPERATURE: 97 F | RESPIRATION RATE: 16 BRPM | HEIGHT: 64 IN

## 2020-08-22 DIAGNOSIS — M79.605 LEFT LEG PAIN: Primary | ICD-10-CM

## 2020-08-22 LAB
BH CV LOWER VASCULAR LEFT COMMON FEMORAL AUGMENT: NORMAL
BH CV LOWER VASCULAR LEFT COMMON FEMORAL COMPETENT: NORMAL
BH CV LOWER VASCULAR LEFT COMMON FEMORAL COMPRESS: NORMAL
BH CV LOWER VASCULAR LEFT COMMON FEMORAL PHASIC: NORMAL
BH CV LOWER VASCULAR LEFT COMMON FEMORAL SPONT: NORMAL
BH CV LOWER VASCULAR LEFT DISTAL FEMORAL COMPRESS: NORMAL
BH CV LOWER VASCULAR LEFT GASTRONEMIUS COMPRESS: NORMAL
BH CV LOWER VASCULAR LEFT GREATER SAPH AK COMPRESS: NORMAL
BH CV LOWER VASCULAR LEFT GREATER SAPH BK COMPRESS: NORMAL
BH CV LOWER VASCULAR LEFT MID FEMORAL AUGMENT: NORMAL
BH CV LOWER VASCULAR LEFT MID FEMORAL COMPETENT: NORMAL
BH CV LOWER VASCULAR LEFT MID FEMORAL COMPRESS: NORMAL
BH CV LOWER VASCULAR LEFT MID FEMORAL PHASIC: NORMAL
BH CV LOWER VASCULAR LEFT MID FEMORAL SPONT: NORMAL
BH CV LOWER VASCULAR LEFT PERONEAL COMPRESS: NORMAL
BH CV LOWER VASCULAR LEFT POPLITEAL AUGMENT: NORMAL
BH CV LOWER VASCULAR LEFT POPLITEAL COMPETENT: NORMAL
BH CV LOWER VASCULAR LEFT POPLITEAL COMPRESS: NORMAL
BH CV LOWER VASCULAR LEFT POPLITEAL PHASIC: NORMAL
BH CV LOWER VASCULAR LEFT POPLITEAL SPONT: NORMAL
BH CV LOWER VASCULAR LEFT POSTERIOR TIBIAL COMPRESS: NORMAL
BH CV LOWER VASCULAR LEFT PROXIMAL FEMORAL COMPRESS: NORMAL
BH CV LOWER VASCULAR LEFT SAPHENOFEMORAL JUNCTION COMPRESS: NORMAL
BH CV LOWER VASCULAR RIGHT COMMON FEMORAL AUGMENT: NORMAL
BH CV LOWER VASCULAR RIGHT COMMON FEMORAL COMPETENT: NORMAL
BH CV LOWER VASCULAR RIGHT COMMON FEMORAL COMPRESS: NORMAL
BH CV LOWER VASCULAR RIGHT COMMON FEMORAL PHASIC: NORMAL
BH CV LOWER VASCULAR RIGHT COMMON FEMORAL SPONT: NORMAL

## 2020-08-22 PROCEDURE — 99283 EMERGENCY DEPT VISIT LOW MDM: CPT

## 2020-08-22 PROCEDURE — 93971 EXTREMITY STUDY: CPT

## 2020-08-22 RX ORDER — IBUPROFEN 800 MG/1
800 TABLET ORAL ONCE
Status: COMPLETED | OUTPATIENT
Start: 2020-08-22 | End: 2020-08-22

## 2020-08-22 RX ADMIN — IBUPROFEN 800 MG: 800 TABLET, FILM COATED ORAL at 10:10

## 2020-08-22 NOTE — ED TRIAGE NOTES
Last night pt developed a red, warm remy on her left lower leg.  Hx factor 5    Patient was placed in face mask during first look triage.  Patient was wearing a face mask throughout encounter.  I wore personal protective equipment throughout the encounter.  Hand hygiene was performed before and after patient encounter.

## 2020-08-22 NOTE — ED PROVIDER NOTES
MD ATTESTATION NOTE    The BIPIN and I have discussed this patient's history, physical exam, and treatment plan.  I have reviewed the documentation and personally had a face to face interaction with the patient. I affirm the documentation and agree with the treatment and plan.  The attached note describes my personal findings.      History  31-year-old female presents with [place] redness.  Redness is just behind the Achilles tendon on the left.  She does have some pain with walking.  She denies any known injury.  Patient does have a history of factor V Leiden but is never had prior DVT.    Physical Exam  Vital Signs reviewed  Alert, Well Appearing in NAD  Extremities- there is a roughly 6 x 6 cm area of erythema and induration near the left Achilles.  There is no significant calf tenderness.  Distal pulses and sensation is grossly intact.    Disposition  I discussed care and management of this patient with KARINA England.  Will obtain Doppler ultrasound to help rule out DVT of lower extremity.     Stephen Martinez MD  08/22/20 1037

## 2020-08-22 NOTE — DISCHARGE INSTRUCTIONS
Ibuprofen as needed for pain   Return if worse or new concerns   Continue care with your primary care physician and have your blood pressure regularly checked and managed. Normal blood pressure is 120/80.

## 2020-08-22 NOTE — ED NOTES
Report was given to YUSUF Fuentes via a phone call. Report included patient's care, treatment, medications, reviewed medication reconciliation, and condition (including any recent changes or anticipated changes). All questions were answered. Report was acknowledged. Patient was placed in face mask during first look triage.  Patient was wearing a face mask throughout encounter.  I wore personal protective equipment throughout the encounter.  Hand hygiene was performed before and after patient encounter.      Bella Schneider RN  08/22/20 0919

## 2020-08-22 NOTE — ED PROVIDER NOTES
EMERGENCY DEPARTMENT ENCOUNTER    Room Number:  04/04  Date of encounter:  8/22/2020  PCP: Provider, No Known  Historian: patient   Full history not obtainable due to: none     HPI:  Chief Complaint: Left leg pain     Context: Margaret Meredith is a 39 y.o. female who presents to the ED c/o left leg pain onset last evening.  The patient reports constant mild pain in her left calf.  Dorsiflexion of the left foot worsens the pain.  Associated redness of the skin.  No fever.  No fall or injury.  History of factor V Leiden but is never had a blood clot in the past.      PAST MEDICAL HISTORY    Active Ambulatory Problems     Diagnosis Date Noted   • Normal pregnancy 04/22/2018   • AMA (advanced maternal age) multigravida 35+ 04/22/2018   • Smoker 04/23/2018   • Abnormal Pap smear of cervix 04/23/2018   • Mild tetrahydrocannabinol (THC) abuse 04/27/2018   • Pregnancy 02/06/2020   • Gestational hypertension 03/08/2020     Resolved Ambulatory Problems     Diagnosis Date Noted   • No Resolved Ambulatory Problems     Past Medical History:   Diagnosis Date   • Factor V Leiden carrier (CMS/HCC)    • Herpes    • HPV (human papilloma virus) infection          PAST SURGICAL HISTORY  Past Surgical History:   Procedure Laterality Date   • TONSILLECTOMY     • WISDOM TOOTH EXTRACTION           FAMILY HISTORY  Family History   Problem Relation Age of Onset   • Breast cancer Mother 35   • Diabetes Maternal Grandmother    • Ovarian cancer Neg Hx    • Uterine cancer Neg Hx    • Colon cancer Neg Hx    • Thyroid disease Neg Hx    • Deep vein thrombosis Neg Hx    • Pulmonary embolism Neg Hx          SOCIAL HISTORY  Social History     Socioeconomic History   • Marital status: Legally      Spouse name: Not on file   • Number of children: Not on file   • Years of education: Not on file   • Highest education level: Not on file   Tobacco Use   • Smoking status: Former Smoker     Start date: 4/9/2018   • Smokeless tobacco: Never Used    Substance and Sexual Activity   • Alcohol use: No   • Drug use: No   • Sexual activity: Yes     Partners: Male     Birth control/protection: None         ALLERGIES  Penicillins        REVIEW OF SYSTEMS  Review of Systems   All systems reviewed and marked as negative except as listed in HPI       PHYSICAL EXAM    I have reviewed the triage vital signs and nursing notes.    ED Triage Vitals [08/22/20 0907]   Temp Heart Rate Resp BP SpO2   97 °F (36.1 °C) 116 16 -- 97 %      Temp src Heart Rate Source Patient Position BP Location FiO2 (%)   Tympanic Monitor -- -- --       GENERAL: Alert well developed, well nourished in no distress  HENT: NCAT, neck supple, trachea midline  EYES: no scleral icterus, PERRL, normal conjunctiva  CV: regular rhythm, regular rate, no murmur  RESPIRATORY: unlabored effort, CTAB  ABDOMEN: soft, non-tender, non-distended, bowel sounds present  MUSCULOSKELETAL: no gross deformity  NEURO: alert,  sensory and motor function of extremities grossly intact, speech clear, mental status normal/baseline  SKIN: warm, dry, patchy erythema, well-circumscribed to the left distal calf.  No tenderness over the Achilles tendon.  Positive Homans sign, negative Mccabe test.  Pedal pulse palpable and intact  PSYCH:  Appropriate mood and affect    Vital signs and nursing notes reviewed.    RADIOLOGY  See US preliminary report below       I ordered the above noted radiological studies. Independently reviewed by me and discussed with radiologist.  See dictation above for official radiology interpretation.      PROCEDURES    Procedures        MEDICATIONS GIVEN IN ER    Medications   ibuprofen (ADVIL,MOTRIN) tablet 800 mg (800 mg Oral Given 8/22/20 1010)         PROGRESS, DATA ANALYSIS, CONSULTS, AND MEDICAL DECISION MAKING    All labs have been independently reviewed by me.  All radiology studies have been reviewed by me.   EKG's independently reviewed by me.  Discussion below represents my analysis of pertinent  findings related to patient's condition, differential diagnosis, treatment plan and final disposition.    DIFFERENTIAL DIAGNOSIS INCLUDE BUT NOT LIMITED TO: Cellulitis, erysipelas, insect bite, necrotizing fasciitis, fracture, abrasion, DVT, SVT    ED Course as of Aug 22 1552   Sat Aug 22, 2020   1228 Discussed with Lexi, vascular technician. LLE US Negative for DVT.     [JS]      ED Course User Index  [JS] Elba England APRN       AS OF 15:52 VITALS:        BP - 121/78  HR - 67  TEMP - 97 °F (36.1 °C) (Tympanic)  02 SATS - 99%        DIAGNOSIS  Final diagnoses:   Left leg pain         DISPOSITION  Discharge     Pt masked in first look. I wore a surgical mask and protective eye wear throughout my encounters with the pt. I performed hand hygiene on entry into the pt room and upon exit.     Dictated utilizing Dragon dictation:  Much of this encounter note is an electronic transcription/translation of spoken language to printed text. The electronic translation of spoken language may permit erroneous, or at times, nonsensical words or phrases to be inadvertently transcribed; Although I have reviewed the note for such errors, some may still exist.       Elba England, FLAVIO  08/22/20 2002

## 2020-08-27 ENCOUNTER — TELEPHONE (OUTPATIENT)
Dept: OBSTETRICS AND GYNECOLOGY | Facility: CLINIC | Age: 40
End: 2020-08-27

## 2020-08-27 NOTE — TELEPHONE ENCOUNTER
Pt states that she was not able to come in for her Postpartum due to COVID. She states that she is now having a lot of pain in her uterus and feels like there might be a lump on it. She would like to know if she could be seen before the weekend because she has to return to work on Monday.  Would you be willing to see this patient sometime before 3 tomorrow? Please advise?

## 2020-08-28 ENCOUNTER — PROCEDURE VISIT (OUTPATIENT)
Dept: OBSTETRICS AND GYNECOLOGY | Facility: CLINIC | Age: 40
End: 2020-08-28

## 2020-08-28 ENCOUNTER — OFFICE VISIT (OUTPATIENT)
Dept: OBSTETRICS AND GYNECOLOGY | Facility: CLINIC | Age: 40
End: 2020-08-28

## 2020-08-28 VITALS
SYSTOLIC BLOOD PRESSURE: 159 MMHG | HEART RATE: 108 BPM | DIASTOLIC BLOOD PRESSURE: 97 MMHG | BODY MASS INDEX: 26.46 KG/M2 | WEIGHT: 155 LBS | HEIGHT: 64 IN

## 2020-08-28 DIAGNOSIS — D25.1 INTRAMURAL LEIOMYOMA OF UTERUS: Primary | ICD-10-CM

## 2020-08-28 DIAGNOSIS — R10.2 PELVIC PAIN: ICD-10-CM

## 2020-08-28 DIAGNOSIS — R30.0 DYSURIA: ICD-10-CM

## 2020-08-28 DIAGNOSIS — D25.9 UTERINE LEIOMYOMA, UNSPECIFIED LOCATION: ICD-10-CM

## 2020-08-28 DIAGNOSIS — R10.2 PELVIC PAIN: Primary | ICD-10-CM

## 2020-08-28 LAB
BILIRUB BLD-MCNC: NEGATIVE MG/DL
GLUCOSE UR STRIP-MCNC: NEGATIVE MG/DL
KETONES UR QL: NEGATIVE
LEUKOCYTE EST, POC: ABNORMAL
NITRITE UR-MCNC: NEGATIVE MG/ML
PH UR: 5 [PH] (ref 5–8)
PROT UR STRIP-MCNC: NEGATIVE MG/DL
RBC # UR STRIP: ABNORMAL /UL
SP GR UR: 1.01 (ref 1–1.03)
UROBILINOGEN UR QL: NORMAL

## 2020-08-28 PROCEDURE — 76830 TRANSVAGINAL US NON-OB: CPT | Performed by: STUDENT IN AN ORGANIZED HEALTH CARE EDUCATION/TRAINING PROGRAM

## 2020-08-28 PROCEDURE — 99213 OFFICE O/P EST LOW 20 MIN: CPT | Performed by: STUDENT IN AN ORGANIZED HEALTH CARE EDUCATION/TRAINING PROGRAM

## 2020-08-28 NOTE — PROGRESS NOTES
Chief Complaint   Patient presents with   • Pelvic Pain     c/o pelvic pain on the left side  x 1week and pain also on the left side with emptying of the bladder for about 1 day.         SUBJECTIVE:     Margaret Meredith is a 39 y.o.  who presents with pelvic pain. The patient reports left lower quadrant pain that started 2 days ago with the start of her period. She reports that pain is associated with a lump in her left lower quadrant. She states the pain is dull and she feels it when she moves and when she voids. She has tried Aleve without improvement. This is the first time she has felt this pain. She also has burning down her left thigh.     The patient has regular menses. Her last menstrual period started on 20. She had a  on 3/9/20. Her baby girl Patricia is doing well.       Past Medical History:   Diagnosis Date   • Abnormal Pap smear of cervix    • Factor V Leiden carrier (CMS/HCC)    • Herpes    • HPV (human papilloma virus) infection       Past Surgical History:   Procedure Laterality Date   • TONSILLECTOMY     • WISDOM TOOTH EXTRACTION        Social History     Tobacco Use   • Smoking status: Former Smoker     Start date: 2018   • Smokeless tobacco: Never Used   Substance Use Topics   • Alcohol use: No   • Drug use: No     OB History    Para Term  AB Living   4 2 2   2 2   SAB TAB Ectopic Molar Multiple Live Births   1 1     0 2      # Outcome Date GA Lbr Severino/2nd Weight Sex Delivery Anes PTL Lv   4 Term 20 37w3d 00:18 / 00:25 2817 g (6 lb 3.4 oz) F Vag-Spont EPI N SAKSHI      Birth Comments: scale 4   3 Term 06 42w0d  3033 g (6 lb 11 oz) F Vag-Spont   SAKSHI   2 SAB      SAB      1 TAB      TAB           Review of Systems   Constitutional: Negative for chills and fever.   HENT: Negative for congestion and sore throat.    Respiratory: Negative for cough and shortness of breath.    Cardiovascular: Negative for chest pain and leg swelling.   Gastrointestinal:  "Positive for abdominal pain. Negative for constipation and diarrhea.   Genitourinary: Positive for dysuria and pelvic pain. Negative for difficulty urinating and vaginal discharge.   Musculoskeletal: Negative for arthralgias and back pain.   Skin: Negative for rash.   Psychiatric/Behavioral: Negative for agitation. The patient is not nervous/anxious.        OBJECTIVE:   Vitals:    08/28/20 1307   BP: 159/97   Pulse: 108   Weight: 70.3 kg (155 lb)   Height: 162.6 cm (64\")        Physical Exam   Constitutional: She is oriented to person, place, and time. She appears well-developed and well-nourished. No distress.   HENT:   Head: Normocephalic and atraumatic.   Eyes: Conjunctivae and EOM are normal.   Neck: Normal range of motion. Neck supple.   Cardiovascular: Normal rate and regular rhythm.   Pulmonary/Chest: Effort normal. No respiratory distress.   Abdominal: Soft. She exhibits no distension and no mass. There is tenderness in the left lower quadrant. There is no rebound and no guarding.   Genitourinary:   Genitourinary Comments: Normal external female genitalia, well estrogenized vaginal mucosa, moderate amount of dark red blood in vaginal vault. Normal appearing cervix without lesions. No cervical motion tenderness. Uterus tender to palpation, mobile, slightly enlarged. No adnexal masses or tenderness.    Musculoskeletal: Normal range of motion. She exhibits no deformity.   Neurological: She is alert and oriented to person, place, and time.   Skin: Skin is warm and dry.   Psychiatric: She has a normal mood and affect. Her behavior is normal.       ASSESSMENT:     ICD-10-CM ICD-9-CM   1. Intramural leiomyoma of uterus D25.1 218.1   2. Pelvic pain R10.2 [id number]   3. Dysuria R30.0 788.1       PLAN:   Reviewed ultrasound report that demonstrated approximately 4 x 4 cm left lateral fibroid of the uterus and no obvious masses within the endometrial cavity. Reviewed UA that showed large blood and trace LE. Will send " urine for culture even though the blood is likely from menses given clean catch urine sample but will r/o UTI. Suspect the patient's pain is most likely from the uterine fibroid based on exam, imaging and timing of symptoms with menstrual cycle. Advised patient to take NSAIDs for pain management and to continue to monitor her pain. We discussed options of hormone suppression but the patient declined use of hormones at this time. Patient would like to proceed with monitoring symptoms and pain control. Patient to follow up per below.     See below for orders    Orders Placed This Encounter   Procedures   • NuSwab VG+ - Swab, Vagina   • Urine Culture - Urine, Urine, Clean Catch     Order Specific Question:   [facility] Has the patient fasted?     Answer:   No   • POC Urinalysis Dipstick      Return in about 3 months (around 11/28/2020) for Annual physical with Dr. Mireles .    Rajni Simmons MD  8/30/2020  18:26

## 2020-08-30 PROBLEM — Z34.90 NORMAL PREGNANCY: Status: RESOLVED | Noted: 2018-04-22 | Resolved: 2020-08-30

## 2020-08-30 PROBLEM — O09.529 AMA (ADVANCED MATERNAL AGE) MULTIGRAVIDA 35+: Status: RESOLVED | Noted: 2018-04-22 | Resolved: 2020-08-30

## 2020-08-30 PROBLEM — Z34.90 PREGNANCY: Status: RESOLVED | Noted: 2020-02-06 | Resolved: 2020-08-30

## 2020-08-30 LAB
BACTERIA UR CULT: NORMAL
BACTERIA UR CULT: NORMAL

## 2020-08-31 LAB
A VAGINAE DNA VAG QL NAA+PROBE: NORMAL SCORE
BVAB2 DNA VAG QL NAA+PROBE: NORMAL SCORE
C ALBICANS DNA VAG QL NAA+PROBE: NEGATIVE
C GLABRATA DNA VAG QL NAA+PROBE: NEGATIVE
C TRACH DNA VAG QL NAA+PROBE: NEGATIVE
MEGA1 DNA VAG QL NAA+PROBE: NORMAL SCORE
N GONORRHOEA DNA VAG QL NAA+PROBE: NEGATIVE
T VAGINALIS DNA VAG QL NAA+PROBE: NEGATIVE

## 2020-09-02 ENCOUNTER — TELEPHONE (OUTPATIENT)
Dept: OBSTETRICS AND GYNECOLOGY | Facility: CLINIC | Age: 40
End: 2020-09-02

## 2020-09-02 NOTE — TELEPHONE ENCOUNTER
Attempted to call patient but no answer. Left voicemail stating she had normal results and may call the clinic with questions/concerns.    Rajni Simmons MD  9/2/2020  13:28

## 2022-05-24 ENCOUNTER — HOSPITAL ENCOUNTER (EMERGENCY)
Facility: HOSPITAL | Age: 42
Discharge: HOME | End: 2022-05-24
Attending: EMERGENCY MEDICINE | Admitting: EMERGENCY MEDICINE

## 2022-05-24 VITALS
HEIGHT: 64 IN | WEIGHT: 160 LBS | SYSTOLIC BLOOD PRESSURE: 116 MMHG | TEMPERATURE: 97.5 F | BODY MASS INDEX: 27.31 KG/M2 | DIASTOLIC BLOOD PRESSURE: 93 MMHG | RESPIRATION RATE: 16 BRPM | HEART RATE: 75 BPM | OXYGEN SATURATION: 100 %

## 2022-05-24 DIAGNOSIS — R42 DIZZINESS: ICD-10-CM

## 2022-05-24 DIAGNOSIS — R00.2 RAPID PALPITATIONS: Primary | ICD-10-CM

## 2022-05-24 DIAGNOSIS — F41.8 ANXIETY ABOUT HEALTH: ICD-10-CM

## 2022-05-24 LAB
ALBUMIN SERPL-MCNC: 4.1 G/DL (ref 3.5–5.2)
ALBUMIN/GLOB SERPL: 1.3 G/DL
ALP SERPL-CCNC: 55 U/L (ref 39–117)
ALT SERPL W P-5'-P-CCNC: 11 U/L (ref 1–33)
ANION GAP SERPL CALCULATED.3IONS-SCNC: 10.9 MMOL/L (ref 5–15)
AST SERPL-CCNC: 14 U/L (ref 1–32)
BASOPHILS # BLD AUTO: 0.09 10*3/MM3 (ref 0–0.2)
BASOPHILS NFR BLD AUTO: 1.1 % (ref 0–1.5)
BILIRUB SERPL-MCNC: 0.2 MG/DL (ref 0–1.2)
BUN SERPL-MCNC: 19 MG/DL (ref 6–20)
BUN/CREAT SERPL: 21.8 (ref 7–25)
CALCIUM SPEC-SCNC: 9.5 MG/DL (ref 8.6–10.5)
CHLORIDE SERPL-SCNC: 106 MMOL/L (ref 98–107)
CO2 SERPL-SCNC: 20.1 MMOL/L (ref 22–29)
CREAT SERPL-MCNC: 0.87 MG/DL (ref 0.57–1)
D DIMER PPP FEU-MCNC: <0.27 MCGFEU/ML (ref 0–0.49)
DEPRECATED RDW RBC AUTO: 47.4 FL (ref 37–54)
EGFRCR SERPLBLD CKD-EPI 2021: 86.5 ML/MIN/1.73
EOSINOPHIL # BLD AUTO: 0.39 10*3/MM3 (ref 0–0.4)
EOSINOPHIL NFR BLD AUTO: 4.6 % (ref 0.3–6.2)
ERYTHROCYTE [DISTWIDTH] IN BLOOD BY AUTOMATED COUNT: 13.7 % (ref 12.3–15.4)
GLOBULIN UR ELPH-MCNC: 3.1 GM/DL
GLUCOSE SERPL-MCNC: 93 MG/DL (ref 65–99)
HCG SERPL QL: NEGATIVE
HCT VFR BLD AUTO: 44.1 % (ref 34–46.6)
HGB BLD-MCNC: 13.8 G/DL (ref 12–15.9)
IMM GRANULOCYTES # BLD AUTO: 0.01 10*3/MM3 (ref 0–0.05)
IMM GRANULOCYTES NFR BLD AUTO: 0.1 % (ref 0–0.5)
LYMPHOCYTES # BLD AUTO: 2.85 10*3/MM3 (ref 0.7–3.1)
LYMPHOCYTES NFR BLD AUTO: 33.5 % (ref 19.6–45.3)
MAGNESIUM SERPL-MCNC: 2.1 MG/DL (ref 1.6–2.6)
MCH RBC QN AUTO: 28.9 PG (ref 26.6–33)
MCHC RBC AUTO-ENTMCNC: 31.3 G/DL (ref 31.5–35.7)
MCV RBC AUTO: 92.3 FL (ref 79–97)
MONOCYTES # BLD AUTO: 0.64 10*3/MM3 (ref 0.1–0.9)
MONOCYTES NFR BLD AUTO: 7.5 % (ref 5–12)
NEUTROPHILS NFR BLD AUTO: 4.54 10*3/MM3 (ref 1.7–7)
NEUTROPHILS NFR BLD AUTO: 53.2 % (ref 42.7–76)
NRBC BLD AUTO-RTO: 0 /100 WBC (ref 0–0.2)
PLATELET # BLD AUTO: 318 10*3/MM3 (ref 140–450)
PMV BLD AUTO: 11.9 FL (ref 6–12)
POTASSIUM SERPL-SCNC: 4.6 MMOL/L (ref 3.5–5.2)
PROT SERPL-MCNC: 7.2 G/DL (ref 6–8.5)
QT INTERVAL: 385 MS
RBC # BLD AUTO: 4.78 10*6/MM3 (ref 3.77–5.28)
SODIUM SERPL-SCNC: 137 MMOL/L (ref 136–145)
T4 FREE SERPL-MCNC: 1.27 NG/DL (ref 0.93–1.7)
TROPONIN T SERPL-MCNC: <0.01 NG/ML (ref 0–0.03)
TSH SERPL DL<=0.05 MIU/L-ACNC: 1.4 UIU/ML (ref 0.27–4.2)
WBC NRBC COR # BLD: 8.52 10*3/MM3 (ref 3.4–10.8)

## 2022-05-24 PROCEDURE — 84439 ASSAY OF FREE THYROXINE: CPT | Performed by: EMERGENCY MEDICINE

## 2022-05-24 PROCEDURE — 99283 EMERGENCY DEPT VISIT LOW MDM: CPT

## 2022-05-24 PROCEDURE — 85379 FIBRIN DEGRADATION QUANT: CPT | Performed by: EMERGENCY MEDICINE

## 2022-05-24 PROCEDURE — 84703 CHORIONIC GONADOTROPIN ASSAY: CPT | Performed by: EMERGENCY MEDICINE

## 2022-05-24 PROCEDURE — 83735 ASSAY OF MAGNESIUM: CPT | Performed by: EMERGENCY MEDICINE

## 2022-05-24 PROCEDURE — 93005 ELECTROCARDIOGRAM TRACING: CPT | Performed by: EMERGENCY MEDICINE

## 2022-05-24 PROCEDURE — 93005 ELECTROCARDIOGRAM TRACING: CPT

## 2022-05-24 PROCEDURE — 84484 ASSAY OF TROPONIN QUANT: CPT | Performed by: EMERGENCY MEDICINE

## 2022-05-24 PROCEDURE — 80050 GENERAL HEALTH PANEL: CPT | Performed by: EMERGENCY MEDICINE

## 2022-05-24 PROCEDURE — 93010 ELECTROCARDIOGRAM REPORT: CPT | Performed by: INTERNAL MEDICINE

## 2022-05-24 RX ORDER — HYDROXYZINE HYDROCHLORIDE 25 MG/1
25 TABLET, FILM COATED ORAL 3 TIMES DAILY PRN
Qty: 15 TABLET | Refills: 0 | Status: SHIPPED | OUTPATIENT
Start: 2022-05-24

## 2022-05-24 RX ORDER — SODIUM CHLORIDE 0.9 % (FLUSH) 0.9 %
10 SYRINGE (ML) INJECTION AS NEEDED
Status: DISCONTINUED | OUTPATIENT
Start: 2022-05-24 | End: 2022-05-24 | Stop reason: HOSPADM

## 2022-05-24 RX ORDER — ALPRAZOLAM 0.25 MG/1
0.5 TABLET ORAL ONCE
Status: COMPLETED | OUTPATIENT
Start: 2022-05-24 | End: 2022-05-24

## 2022-05-24 RX ADMIN — SODIUM CHLORIDE 1000 ML: 9 INJECTION, SOLUTION INTRAVENOUS at 14:42

## 2022-05-24 RX ADMIN — ALPRAZOLAM 0.5 MG: 0.25 TABLET ORAL at 14:22

## 2022-05-24 NOTE — DISCHARGE INSTRUCTIONS
Take the medications as needed and follow-up with your family doctor.  Do follow-up with [facility].  Call tomorrow for an appointment.

## 2022-05-24 NOTE — ED PROVIDER NOTES
EMERGENCY DEPARTMENT ENCOUNTER  I wore full protective equipment throughout this patient encounter including a N95 mask, eye shield, gown and gloves. Hand hygiene was performed before donning protective equipment and after removal when leaving the room.    Room Number:  38/38  Date of encounter:  5/24/2022  PCP: Provider, No Known    HPI:  Context: Magraret Meredith is a 41 y.o. female who presents to the ED c/o chief complaint of potation's for 7 days.  Patient has felt like her heart is been racing and skipping beats for 7 days.  Today, while carrying her daughter down the stairs, she became lightheaded and dizzy and felt like she was going to pass out.  She also is complaining of chest pressure this been constant for the past week.  She denies fever, chills, nausea or vomiting.  She has felt more anxious over the past week.  She denies leg pain, leg swelling or recent travel.    MEDICAL HISTORY REVIEW  Reviewed in EPIC    PAST MEDICAL HISTORY  Active Ambulatory Problems     Diagnosis Date Noted   • Smoker 04/23/2018   • Abnormal Pap smear of cervix 04/23/2018   • Mild tetrahydrocannabinol (THC) abuse 04/27/2018   • Gestational hypertension 03/08/2020     Resolved Ambulatory Problems     Diagnosis Date Noted   • Normal pregnancy 04/22/2018   • AMA (advanced maternal age) multigravida 35+ 04/22/2018   • Pregnancy 02/06/2020     Past Medical History:   Diagnosis Date   • Factor V Leiden carrier (CMS/HCC)    • Herpes    • HPV (human papilloma virus) infection        PAST SURGICAL HISTORY  Past Surgical History:   Procedure Laterality Date   • TONSILLECTOMY     • WISDOM TOOTH EXTRACTION         FAMILY HISTORY  Family History   Problem Relation Age of Onset   • Breast cancer Mother 35   • Diabetes Maternal Grandmother    • Ovarian cancer Neg Hx    • Uterine cancer Neg Hx    • Colon cancer Neg Hx    • Thyroid disease Neg Hx    • Deep vein thrombosis Neg Hx    • Pulmonary embolism Neg Hx        SOCIAL HISTORY  Social  History     Socioeconomic History   • Marital status: Legally    Tobacco Use   • Smoking status: Former Smoker     Start date: 4/9/2018   • Smokeless tobacco: Never Used   Substance and Sexual Activity   • Alcohol use: No   • Drug use: No   • Sexual activity: Yes     Partners: Male     Birth control/protection: None       ALLERGIES  Penicillins    The patient's allergies have been reviewed    REVIEW OF SYSTEMS  All systems reviewed and negative except for those discussed in HPI.     PHYSICAL EXAM  I have reviewed the triage vital signs and nursing notes.  ED Triage Vitals   Temp Heart Rate Resp BP SpO2   05/24/22 1342 05/24/22 1342 05/24/22 1342 05/24/22 1426 05/24/22 1342   97.5 °F (36.4 °C) 93 16 (!) 154/113 99 %      Temp src Heart Rate Source Patient Position BP Location FiO2 (%)   05/24/22 1342 -- -- -- --   Tympanic             General: Anxious appearing  HENT: NCAT, PERRL, Nares patent.  Eyes: no scleral icterus.  Neck: trachea midline, no ROM limitations.  No thyromegaly.  CV: regular rhythm, regular rate.  Respiratory: normal effort, CTAB.  Abdomen: soft, nondistended, NTTP, no rebound tenderness, no guarding or rigidity.  Musculoskeletal: no deformity.  TALAT or calf tenderness.  Neuro: alert, moves all extremities, follows commands.  Skin: warm, dry.    LAB RESULTS  Recent Results (from the past 24 hour(s))   ECG 12 Lead    Collection Time: 05/24/22  1:50 PM   Result Value Ref Range    QT Interval 385 ms   Comprehensive Metabolic Panel    Collection Time: 05/24/22  2:30 PM    Specimen: Arm, Right; Blood   Result Value Ref Range    Glucose 93 65 - 99 mg/dL    BUN 19 6 - 20 mg/dL    Creatinine 0.87 0.57 - 1.00 mg/dL    Sodium 137 136 - 145 mmol/L    Potassium 4.6 3.5 - 5.2 mmol/L    Chloride 106 98 - 107 mmol/L    CO2 20.1 (L) 22.0 - 29.0 mmol/L    Calcium 9.5 8.6 - 10.5 mg/dL    Total Protein 7.2 6.0 - 8.5 g/dL    Albumin 4.10 3.50 - 5.20 g/dL    ALT (SGPT) 11 1 - 33 U/L    AST (SGOT) 14 1 - 32 U/L     Alkaline Phosphatase 55 39 - 117 U/L    Total Bilirubin 0.2 0.0 - 1.2 mg/dL    Globulin 3.1 gm/dL    A/G Ratio 1.3 g/dL    BUN/Creatinine Ratio 21.8 7.0 - 25.0    Anion Gap 10.9 5.0 - 15.0 mmol/L    eGFR 86.5 >60.0 mL/min/1.73   hCG, Serum, Qualitative    Collection Time: 05/24/22  2:30 PM    Specimen: Arm, Right; Blood   Result Value Ref Range    HCG Qualitative Negative Negative   D-dimer, Quantitative    Collection Time: 05/24/22  2:30 PM    Specimen: Arm, Right; Blood   Result Value Ref Range    D-Dimer, Quantitative <0.27 0.00 - 0.49 MCGFEU/mL   Troponin    Collection Time: 05/24/22  2:30 PM    Specimen: Arm, Right; Blood   Result Value Ref Range    Troponin T <0.010 0.000 - 0.030 ng/mL   Magnesium    Collection Time: 05/24/22  2:30 PM    Specimen: Arm, Right; Blood   Result Value Ref Range    Magnesium 2.1 1.6 - 2.6 mg/dL   TSH    Collection Time: 05/24/22  2:30 PM    Specimen: Arm, Right; Blood   Result Value Ref Range    TSH 1.400 0.270 - 4.200 uIU/mL   T4, Free    Collection Time: 05/24/22  2:30 PM    Specimen: Arm, Right; Blood   Result Value Ref Range    Free T4 1.27 0.93 - 1.70 ng/dL   CBC Auto Differential    Collection Time: 05/24/22  2:30 PM    Specimen: Blood   Result Value Ref Range    WBC 8.52 3.40 - 10.80 10*3/mm3    RBC 4.78 3.77 - 5.28 10*6/mm3    Hemoglobin 13.8 12.0 - 15.9 g/dL    Hematocrit 44.1 34.0 - 46.6 %    MCV 92.3 79.0 - 97.0 fL    MCH 28.9 26.6 - 33.0 pg    MCHC 31.3 (L) 31.5 - 35.7 g/dL    RDW 13.7 12.3 - 15.4 %    RDW-SD 47.4 37.0 - 54.0 fl    MPV 11.9 6.0 - 12.0 fL    Platelets 318 140 - 450 10*3/mm3    Neutrophil % 53.2 42.7 - 76.0 %    Lymphocyte % 33.5 19.6 - 45.3 %    Monocyte % 7.5 5.0 - 12.0 %    Eosinophil % 4.6 0.3 - 6.2 %    Basophil % 1.1 0.0 - 1.5 %    Immature Grans % 0.1 0.0 - 0.5 %    Neutrophils, Absolute 4.54 1.70 - 7.00 10*3/mm3    Lymphocytes, Absolute 2.85 0.70 - 3.10 10*3/mm3    Monocytes, Absolute 0.64 0.10 - 0.90 10*3/mm3    Eosinophils, Absolute 0.39 0.00  - 0.40 10*3/mm3    Basophils, Absolute 0.09 0.00 - 0.20 10*3/mm3    Immature Grans, Absolute 0.01 0.00 - 0.05 10*3/mm3    nRBC 0.0 0.0 - 0.2 /100 WBC       I ordered the above labs and reviewed the results.    RADIOLOGY  No Radiology Exams Resulted Within Past 24 Hours    I ordered the above noted radiological studies. I reviewed the images and results. I agree with the radiologist interpretation.    PROCEDURES  Procedures  EKG          EKG time: 1350  Rhythm/Rate: Sinus arrhythmia, rate 65  P waves and RI: normal  QRS, axis: normal   ST and T waves: normal     Interpreted Contemporaneously by me, independently viewed  No previous EKG    MEDICATIONS GIVEN IN ER  Medications   sodium chloride 0.9 % flush 10 mL (has no administration in time range)   ALPRAZolam (XANAX) tablet 0.5 mg (0.5 mg Oral Given 5/24/22 1422)   sodium chloride 0.9 % bolus 1,000 mL (0 mL Intravenous Stopped 5/24/22 1609)       PROGRESS, DATA ANALYSIS, CONSULTS, AND MEDICAL DECISION MAKING  A complete history and physical exam have been performed.  All available laboratory and imaging results have been reviewed by myself prior to disposition.    MDM  After the initial H&P, I discussed pertinent information from history and physical exam with patient/family.  Discussed differential diagnosis.  Discussed plan for ED evaluation/workup/treatment.  All questions answered.  Patient/family is agreeable with plan.  ED Course as of 05/24/22 1632   Tue May 24, 2022   1408 Patient presents with palpitations that she feels fast, anxiety and chest discomfort.  Patient states she has a history of factor V Leiden so we will check a D-dimer.  We will also give IV fluids is something for her anxiety, and also rule out electrolyte disorders and heart attack. [DE]   1506 HCG Qualitative: Negative [DE]   1506 D-Dimer, Quant: <0.27 [DE]   1535 Free T4: 1.27 [DE]   1535 TSH Baseline: 1.400 [DE]   1535 Troponin T: <0.010 [DE]   1540 Updated patient on the results of her  blood work.  Patient states she is feeling better with the Xanax.  Patient states she does not have a primary physician and she is concerned about the palpitations.  We will give number to the patient liaison number and will give a referral to cardiology.  Of note, patient is not breast-feeding her daughter. [DE]      ED Course User Index  [DE] Davi Nolan MD       AS OF 16:32 EDT VITALS:    BP - 116/93  HR - 75  TEMP - 97.5 °F (36.4 °C) (Tympanic)  O2 SATS - 100%    DIAGNOSIS  Final diagnoses:   Rapid palpitations   Dizziness   Anxiety about health         DISPOSITION    DISCHARGE    Patient discharged in stable condition.    Reviewed implications of results, diagnosis, meds, responsibility to follow up, warning signs and symptoms of possible worsening, potential complications and reasons to return to ER.    Patient/Family voiced understanding of above instructions.    Discussed plan for discharge, as there is no emergent indication for admission. Patient referred to primary care provider for BP management due to today's BP. Pt/family is agreeable and understands need for follow up and repeat testing.  Pt is aware that discharge does not mean that nothing is wrong but it indicates no emergency is present that requires admission and they must continue care with follow-up as given below or physician of their choice.     FOLLOW-UP  PATIENT CONNECTION - [facility] [id number]  [phone]  Schedule an appointment as soon as possible for a visit       [facility]  [address] [place] [address] [phone]  Schedule an appointment as soon as possible for a visit            Medication List      New Prescriptions    hydrOXYzine 25 MG tablet  Commonly known as: ATARAX  Take 1 tablet by mouth 3 (Three) Times a Day As Needed for Anxiety.           Where to Get Your Medications      These medications were sent to [id number] - [place], [place]  - [address] AT [facility] - [phone]  - [phone] FX  [address], [facility] [address]    Phone: [phone]   · hydrOXYzine 25 MG tablet          Davi Nolan MD  05/24/22 7687

## 2022-05-24 NOTE — ED TRIAGE NOTES
Pt to ED from home with c/o palpitations and dizzy x 1 week.  Pt denies cardiac hx.    Pt wearing mask, staff wearing appropriate PPE.

## 2022-06-10 ENCOUNTER — LAB (OUTPATIENT)
Dept: LAB | Facility: HOSPITAL | Age: 42
End: 2022-06-10

## 2022-06-10 ENCOUNTER — OFFICE VISIT (OUTPATIENT)
Dept: CARDIOLOGY | Facility: CLINIC | Age: 42
End: 2022-06-10

## 2022-06-10 VITALS
BODY MASS INDEX: 25.03 KG/M2 | WEIGHT: 145.8 LBS | SYSTOLIC BLOOD PRESSURE: 138 MMHG | HEART RATE: 78 BPM | DIASTOLIC BLOOD PRESSURE: 80 MMHG

## 2022-06-10 DIAGNOSIS — R00.2 PALPITATIONS: Primary | ICD-10-CM

## 2022-06-10 DIAGNOSIS — Z82.3 FAMILY HISTORY OF STROKE: ICD-10-CM

## 2022-06-10 LAB
CHOLEST SERPL-MCNC: 196 MG/DL (ref 0–200)
HDLC SERPL-MCNC: 70 MG/DL (ref 40–60)
LDLC SERPL CALC-MCNC: 119 MG/DL (ref 0–100)
LDLC/HDLC SERPL: 1.7 {RATIO}
TRIGL SERPL-MCNC: 35 MG/DL (ref 0–150)
VLDLC SERPL-MCNC: 7 MG/DL (ref 5–40)

## 2022-06-10 PROCEDURE — 99204 OFFICE O/P NEW MOD 45 MIN: CPT | Performed by: INTERNAL MEDICINE

## 2022-06-10 PROCEDURE — 80061 LIPID PANEL: CPT

## 2022-06-10 PROCEDURE — 93000 ELECTROCARDIOGRAM COMPLETE: CPT | Performed by: INTERNAL MEDICINE

## 2022-06-10 PROCEDURE — 36415 COLL VENOUS BLD VENIPUNCTURE: CPT

## 2022-06-10 NOTE — PROGRESS NOTES
PATIENTINFORMATION    Date of Office Visit: 06/10/2022  Encounter Provider: Robert Molina MD  Place of Service: [facility] CARDIOLOGY  Patient Name: Margaret Meredith  : 1980    Subjective:     Encounter Date:06/10/2022      Patient ID: Margaret Meredith is a 41 y.o. female.    Chief Complaint   Patient presents with   • Shortness of Breath   • Palpitations     HPI  Ms. Meredith is a 41 years old pleasant lady who came to clinic for evaluation of palpitations that started on 2022.  She started having palpitations on 2022 when getting down stairs holding care child.  She describes episodes as heart racing fast intermittently skipping beats associated with feeling lightheaded as if she was going to pass out.  Episode resolved within 10 minutes but she was scared and decided to go to the ER and ER work-up was unremarkable and EKG showed sinus arrhythmia.  She continued to have palpitations almost daily since then with no known exacerbating or relieving factor.  She had an episode this morning.  She denies any known prior diagnosis of heart disease.  She denies any chest pain, orthopnea, PND, syncope or extremity swelling.  She smokes about 5-10 cigarettes/day and smokes more when she is stressed out.  She denies any recreational drug use or alcohol abuse.  She has longstanding history of generalized anxiety disorder but not on treatment.  Her dad had a stroke when he was in his 50s.      ROS  All systems reviewed and negative except as noted in HPI.    Past Medical History:   Diagnosis Date   • Abnormal Pap smear of cervix 2018   • Clotting disorder (HCC)    • Factor V Leiden carrier (HCC)    • Herpes    • HPV (human papilloma virus) infection        Past Surgical History:   Procedure Laterality Date   • TONSILLECTOMY     • WISDOM TOOTH EXTRACTION         Social History     Socioeconomic History   • Marital status: Legally    Tobacco Use   • Smoking status: Current Every  Day Smoker     Packs/day: 0.50     Types: Cigarettes     Start date: 4/9/2018   • Smokeless tobacco: Never Used   Substance and Sexual Activity   • Alcohol use: No   • Drug use: No   • Sexual activity: Yes     Partners: Male     Birth control/protection: None       Family History   Problem Relation Age of Onset   • Breast cancer Mother 35   • Diabetes Maternal Grandmother    • Ovarian cancer Neg Hx    • Uterine cancer Neg Hx    • Colon cancer Neg Hx    • Thyroid disease Neg Hx    • Deep vein thrombosis Neg Hx    • Pulmonary embolism Neg Hx            ECG 12 Lead    Date/Time: 6/10/2022 10:02 AM  Performed by: Robert Molina MD  Authorized by: Robert Molina MD   Comparison: compared with previous ECG from 5/24/2022  Similar to previous ECG  Comparison to previous ECG: Sinus rhythm replaces sinus arrhythmia on this tracing.  Rhythm: sinus rhythm  Rate: normal  Conduction: conduction normal  ST Segments: ST segments normal  T Waves: T waves normal  QRS axis: normal  Other: no other findings    Clinical impression: normal ECG               Objective:     /80 (BP Location: Left arm, Patient Position: Sitting)   Pulse 78   Wt 66.1 kg (145 lb 12.8 oz)   LMP 05/16/2022   BMI 25.03 kg/m²  Body mass index is 25.03 kg/m².     Constitutional:       General: Not in acute distress.     Appearance: Well-developed. Not diaphoretic.   Eyes:      Pupils: Pupils are equal, round, and reactive to light.   HENT:      Head: Normocephalic and atraumatic.   Neck:      Thyroid: No thyromegaly.   Pulmonary:      Effort: Pulmonary effort is normal. No respiratory distress.      Breath sounds: Normal breath sounds. No wheezing. No rales.   Chest:      Chest wall: Not tender to palpatation.   Cardiovascular:      Normal rate. Regular rhythm.      No gallop.   Pulses:     Intact distal pulses.   Edema:     Peripheral edema absent.   Abdominal:      General: Bowel sounds are normal. There is no distension.       Palpations: Abdomen is soft.      Tenderness: There is no guarding.   Musculoskeletal: Normal range of motion.         General: No deformity.      Cervical back: Normal range of motion and neck supple. Skin:     General: Skin is warm and dry.      Findings: No rash.   Neurological:      Mental Status: Alert and oriented to person, place, and time.      Cranial Nerves: No cranial nerve deficit.      Deep Tendon Reflexes: Reflexes are normal and symmetric.   Psychiatric:         Judgment: Judgment normal.         Review Of Data: I have reviewed pertinent recent labs, images and documents and pertinent findings included in HPI or assessment below.    Lipid Panel    Lipid Panel 6/10/22   Total Cholesterol 196   Triglycerides 35   HDL Cholesterol 70 (A)   VLDL Cholesterol 7   LDL Cholesterol  119 (A)   LDL/HDL Ratio 1.70   (A) Abnormal value                Assessment/Plan:         1.  Palpitations-does not seem to be sustained but happens almost every day associated with lightheadedness.  Office and ER EKG is unremarkable  I will send her out on 48-hour Holter monitor    2.  Generalized anxiety disorder/past history of trichotillomania when she is a stress  She would likely benefit from starting SSRI    3.  Tobacco use-counseled    4.  Family history of premature atherosclerotic disease with father with CVA in his 50s.  Check lipid panel    Diagnosis and plan of care discussed with patient and verbalized understanding.            Your medication list          Accurate as of Azalia 10, 2022 12:32 PM. If you have any questions, ask your nurse or doctor.            CONTINUE taking these medications      Instructions Last Dose Given Next Dose Due   hydrOXYzine 25 MG tablet  Commonly known as: ATARAX      Take 1 tablet by mouth 3 (Three) Times a Day As Needed for Anxiety.                  Robert Molina MD  06/10/22  12:32 EDT

## 2022-06-12 NOTE — PROGRESS NOTES
Please notify Margaret that she has mildly abnormal lipid panel that can be managed with diet. Will call her once other studies are complete. Because of her family history encourage to get vascular screening for disease of the carotid and d calcium score at at [place]. You can find flyers at our pod. Thanks!

## 2022-06-13 ENCOUNTER — TELEPHONE (OUTPATIENT)
Dept: CARDIOLOGY | Facility: CLINIC | Age: 42
End: 2022-06-13

## 2022-06-13 NOTE — TELEPHONE ENCOUNTER
Attempted to call patient; no answer or option to leave a voicemail. Will continue trying to contact the patient.

## 2022-06-13 NOTE — TELEPHONE ENCOUNTER
----- Message from Robert Molina MD sent at 6/12/2022  6:21 PM EDT -----  Please notify Margaret that she has mildly abnormal lipid panel that can be managed with diet. Will call her once other studies are complete. Because of her family history encourage to get vascular screening for disease of the carotid and d calcium score at at [place]. You can find flyers at our pod. Thanks!

## 2022-06-14 NOTE — TELEPHONE ENCOUNTER
Patient notified to call and scheduled vascular screening and cost of each test. Patient verbalized understanding.     Dr. Molina,  Can you place the order for her calcium score? Patient also wanted to let you know that she will not be able to return the holter monitor for a few more days due to transportation, but plans to have that back this week.    Thank you,  Desiree Lopes RN  [facility] Triage  06/14/22  14:18 EDT

## 2022-06-14 NOTE — TELEPHONE ENCOUNTER
Attempted to call patient; no answer or option to leave a voicemail. Letter sent to patient requesting call back.

## 2022-06-14 NOTE — TELEPHONE ENCOUNTER
Spoke to patient and notified her about her results and recommendations for vascular screening, patient was agreeable to vascular screening. Attempted to call patient back with instructions for scheduling vascular screening, but patient does not have a voicemail and did not answer.    Patient needs to be notified to call and schedule screening and prices.     Desiree Lopes RN  [facility] Triage  06/14/22  10:29 EDT

## 2022-06-22 ENCOUNTER — TELEPHONE (OUTPATIENT)
Dept: ORTHOPEDIC SURGERY | Facility: CLINIC | Age: 42
End: 2022-06-22

## 2022-06-22 NOTE — TELEPHONE ENCOUNTER
[facility] calling to see if we can work this patient in this week.  Patient has a non displaced radial  head fracture.  Please advised.  Notes in chart.  Please call patient [phone]

## 2022-06-30 ENCOUNTER — TELEPHONE (OUTPATIENT)
Dept: CARDIOLOGY | Facility: CLINIC | Age: 42
End: 2022-06-30

## 2022-06-30 RX ORDER — PROPRANOLOL HCL 60 MG
60 CAPSULE, EXTENDED RELEASE 24HR ORAL DAILY
Qty: 30 CAPSULE | Refills: 3 | Status: SHIPPED | OUTPATIENT
Start: 2022-06-30

## 2022-06-30 NOTE — TELEPHONE ENCOUNTER
----- Message from Robert Molina MD sent at 6/29/2022  4:38 PM EDT -----  I tried to call this patient to update results of Holter monitor.  Did not answer and voicemail is full.  Holter does not show any significant arrhythmia but her baseline heart rate is elevated with average closing to 100 bpm.  With her anxiety she may benefit from starting a beta-blocker like propranolol and I cannot call prescription for that if she is agreeable.  Let me know what she thinks.  Thank you

## 2022-06-30 NOTE — TELEPHONE ENCOUNTER
Patient was given the phone number for Gladis Teixeira to call and schedule her vascular screenings. Patient fell and broke her arm, and is currently out of town but will call when she returns and feels better.     Desiree Lopes RN  [facility] Triage  06/30/22  09:27 EDT

## 2022-06-30 NOTE — TELEPHONE ENCOUNTER
Dr. Molina,     Margaret Meredith notified of results and recommendations. Patient verbalized understanding and is agreeable to starting propanolol.    Patient states she is currently out of town and recently fell and broke her arm. She has not called to scheduled her vascular screening bundle, but will when she returns and feels better. I gave her the phone number for Gladis Teixeira to call.     Please let me know if you need anything further.     Desiree Lopes RN  [facility] Triage  06/30/22  09:32 EDT

## 2022-07-18 ENCOUNTER — OFFICE VISIT (OUTPATIENT)
Dept: ORTHOPEDIC SURGERY | Facility: CLINIC | Age: 42
End: 2022-07-18

## 2022-07-18 VITALS — HEIGHT: 64 IN | BODY MASS INDEX: 24.75 KG/M2 | WEIGHT: 145 LBS | TEMPERATURE: 97.8 F

## 2022-07-18 DIAGNOSIS — S52.124A CLOSED NONDISPLACED FRACTURE OF HEAD OF RIGHT RADIUS, INITIAL ENCOUNTER: ICD-10-CM

## 2022-07-18 DIAGNOSIS — M25.521 ELBOW PAIN, RIGHT: Primary | ICD-10-CM

## 2022-07-18 PROCEDURE — 73070 X-RAY EXAM OF ELBOW: CPT | Performed by: NURSE PRACTITIONER

## 2022-07-18 PROCEDURE — 99213 OFFICE O/P EST LOW 20 MIN: CPT | Performed by: NURSE PRACTITIONER

## 2022-07-18 NOTE — PROGRESS NOTES
Patient Name: Margaret Meredith   YOB: 1980  Referring Primary Care Physician: Provider, No Known  BMI: Body mass index is 24.89 kg/m².    Chief Complaint:    Chief Complaint   Patient presents with   • Right Elbow - Pain        HPI: Fall on outstreched hand almost 1 month ago and dx with radial head fracture works as a  at [employer] fill has pain with movement  RHD Margaret Meredith is a 41 y.o. female who presents today for evaluation of   Chief Complaint   Patient presents with   • Right Elbow - Pain         Subjective   Medications:   Home Medications:  Current Outpatient Medications on File Prior to Visit   Medication Sig   • hydrOXYzine (ATARAX) 25 MG tablet Take 1 tablet by mouth 3 (Three) Times a Day As Needed for Anxiety.   • ondansetron ODT (ZOFRAN-ODT) 4 MG disintegrating tablet Place 1 tablet on the tongue Every 8 (Eight) Hours As Needed for Nausea or Vomiting.   • propranolol LA (Inderal LA) 60 MG 24 hr capsule Take 1 capsule by mouth Daily.     No current facility-administered medications on file prior to visit.     Current Medications:  Scheduled Meds:  Continuous Infusions:No current facility-administered medications for this visit.    PRN Meds:.    I have reviewed the patient's medical history in detail and updated the computerized patient record.  Review and summarization of old records includes:    Past Medical History:   Diagnosis Date   • Abnormal Pap smear of cervix 2018   • Clotting disorder (HCC)    • Factor V Leiden carrier (HCC)    • Herpes    • HPV (human papilloma virus) infection         Past Surgical History:   Procedure Laterality Date   • TONSILLECTOMY     • WISDOM TOOTH EXTRACTION          Social History     Occupational History   • Not on file   Tobacco Use   • Smoking status: Current Every Day Smoker     Packs/day: 0.50     Types: Cigarettes     Start date: 4/9/2018   • Smokeless tobacco: Never Used   Vaping Use   • Vaping Use: Never used   Substance and  "Sexual Activity   • Alcohol use: No   • Drug use: No   • Sexual activity: Yes     Partners: Male     Birth control/protection: None      Social History     Social History Narrative   • Not on file        Family History   Problem Relation Age of Onset   • Breast cancer Mother 35   • Diabetes Maternal Grandmother    • Lung cancer Maternal Grandfather    • Pancreatic cancer Paternal Grandmother    • Heart attack Paternal Grandfather    • Ovarian cancer Neg Hx    • Uterine cancer Neg Hx    • Colon cancer Neg Hx    • Thyroid disease Neg Hx    • Deep vein thrombosis Neg Hx    • Pulmonary embolism Neg Hx        ROS: 14 point review of systems was performed and all other systems were reviewed and are negative except for documented findings in HPI and today's encounter.     Allergies:   Allergies   Allergen Reactions   • Penicillins Unknown (See Comments) and Anaphylaxis     Throat closes up and can not breath  Throat closes up and can not breath     Constitutional:  Denies fever, shaking or chills   Eyes:  Denies change in visual acuity   HENT:  Denies nasal congestion or sore throat   Respiratory:  Denies cough or shortness of breath   Cardiovascular:  Denies chest pain or severe LE edema   GI:  Denies abdominal pain, nausea, vomiting, bloody stools or diarrhea   Musculoskeletal:  Numbness, tingling, pain, or loss of motor function only as noted above in history of present illness.  : Denies painful urination or hematuria  Integument:  Denies rash, lesion or ulceration   Neurologic:  Denies headache or focal weakness  Endocrine:  Denies lymphadenopathy  Psych:  Denies confusion or change in mental status   Hem:  Denies active bleeding    OBJECTIVE:  Physical Exam: 41 y.o. female  Wt Readings from Last 3 Encounters:   07/18/22 65.8 kg (145 lb)   06/21/22 65.8 kg (145 lb)   06/10/22 66.1 kg (145 lb 12.8 oz)     Ht Readings from Last 1 Encounters:   07/18/22 162.6 cm (64\")     Body mass index is 24.89 kg/m².  Vitals:    " 07/18/22 1533   Temp: 97.8 °F (36.6 °C)     Vital signs reviewed.     General Appearance:    Alert, cooperative, in no acute distress                  Eyes: conjunctiva clear  ENT: external ears and nose atraumatic  CV: no peripheral edema  Resp: normal respiratory effort  Skin: no rashes or wounds; normal turgor  Psych: mood and affect appropriate  Lymph: no nodes appreciated  Neuro: gross sensation intact  Vascular:  Palpable peripheral pulse in noted extremity  Musculoskeletal Extremities: Skin warm dry intact with good pulses movement and sensation tenderness over the radial head neurovascular intact wrist is nontender pain with supination and pronation    Radiology:   Right elbow 2 views radial head fracture non displaced positive effusion    Assessment:     ICD-10-CM ICD-9-CM   1. Elbow pain, right  M25.521 719.42   2. Closed nondisplaced fracture of head of right radius, initial encounter  S52.124A 813.05        Procedures    No lift pull push greater than 5 pounds right upper extremity up with sports medicine start physical therapy  MDM/Plan:   The diagnosis(es), natural history, pathophysiology and treatment for diagnosis(es) were discussed. Opportunity given and questions answered.  Biomechanics of pertinent body areas discussed.  When appropriate, the use of ambulatory aids discussed.  EXERCISES:  Advice on benefits of, and types of regular/moderate exercise pertaining to orthopedic diagnosis(es).  MEDICATIONS:  The risks, benefits, warnings,side effects and alternatives of medications discussed.  Inflammation/pain control; with cold, heat, elevation and/or liniments discussed as appropriate  PT referral.  SPECIALTY REFERRAL  MEDICAL RECORDS reviewed from other provider(s) for past and current medical history pertinent to this complaint.      7/18/2022    Much of this encounter note is an electronic transcription/translation of spoken language to printed text. The electronic translation of spoken language  may permit erroneous, or at times, nonsensical words or phrases to be inadvertently transcribed; Although I have reviewed the note for such errors, some may still exist